# Patient Record
Sex: FEMALE | Race: OTHER | HISPANIC OR LATINO | Employment: FULL TIME | URBAN - METROPOLITAN AREA
[De-identification: names, ages, dates, MRNs, and addresses within clinical notes are randomized per-mention and may not be internally consistent; named-entity substitution may affect disease eponyms.]

---

## 2017-07-31 ENCOUNTER — HOSPITAL ENCOUNTER (EMERGENCY)
Facility: HOSPITAL | Age: 52
Discharge: HOME | End: 2017-07-31
Attending: EMERGENCY MEDICINE
Payer: COMMERCIAL

## 2017-07-31 ENCOUNTER — APPOINTMENT (EMERGENCY)
Dept: RADIOLOGY | Facility: HOSPITAL | Age: 52
End: 2017-07-31
Payer: COMMERCIAL

## 2017-07-31 VITALS
BODY MASS INDEX: 34.55 KG/M2 | OXYGEN SATURATION: 98 % | HEART RATE: 100 BPM | WEIGHT: 176 LBS | DIASTOLIC BLOOD PRESSURE: 84 MMHG | SYSTOLIC BLOOD PRESSURE: 125 MMHG | RESPIRATION RATE: 18 BRPM | HEIGHT: 60 IN | TEMPERATURE: 98.1 F

## 2017-07-31 DIAGNOSIS — G56.00 CARPAL TUNNEL SYNDROME: Primary | ICD-10-CM

## 2017-07-31 PROCEDURE — 96372 THER/PROPH/DIAG INJ SC/IM: CPT

## 2017-07-31 PROCEDURE — 99283 EMERGENCY DEPT VISIT LOW MDM: CPT

## 2017-07-31 PROCEDURE — 73110 X-RAY EXAM OF WRIST: CPT

## 2017-07-31 RX ORDER — KETOROLAC TROMETHAMINE 30 MG/ML
15 INJECTION, SOLUTION INTRAMUSCULAR; INTRAVENOUS ONCE
Status: COMPLETED | OUTPATIENT
Start: 2017-07-31 | End: 2017-07-31

## 2017-07-31 RX ORDER — NAPROXEN 500 MG/1
500 TABLET ORAL 2 TIMES DAILY WITH MEALS
Qty: 15 TABLET | Refills: 0 | Status: SHIPPED | OUTPATIENT
Start: 2017-07-31 | End: 2017-08-07

## 2017-07-31 RX ORDER — LANOLIN ALCOHOL/MO/W.PET/CERES
1 CREAM (GRAM) TOPICAL 3 TIMES DAILY
COMMUNITY

## 2017-07-31 RX ADMIN — KETOROLAC TROMETHAMINE 15 MG: 30 INJECTION, SOLUTION INTRAMUSCULAR at 22:22

## 2018-05-08 ENCOUNTER — FORM ENCOUNTER (OUTPATIENT)
Age: 53
End: 2018-05-08

## 2018-05-08 ENCOUNTER — APPOINTMENT (OUTPATIENT)
Dept: NEPHROLOGY | Facility: CLINIC | Age: 53
End: 2018-05-08
Payer: COMMERCIAL

## 2018-05-08 VITALS — SYSTOLIC BLOOD PRESSURE: 120 MMHG | DIASTOLIC BLOOD PRESSURE: 80 MMHG | HEART RATE: 75 BPM | RESPIRATION RATE: 12 BRPM

## 2018-05-08 DIAGNOSIS — D17.9 BENIGN LIPOMATOUS NEOPLASM, UNSPECIFIED: ICD-10-CM

## 2018-05-08 DIAGNOSIS — R82.99 OTHER ABNORMAL FINDINGS IN URINE: ICD-10-CM

## 2018-05-08 DIAGNOSIS — R31.29 OTHER MICROSCOPIC HEMATURIA: ICD-10-CM

## 2018-05-08 DIAGNOSIS — N28.1 CYST OF KIDNEY, ACQUIRED: ICD-10-CM

## 2018-05-08 PROCEDURE — 99244 OFF/OP CNSLTJ NEW/EST MOD 40: CPT

## 2018-05-09 ENCOUNTER — APPOINTMENT (OUTPATIENT)
Dept: ULTRASOUND IMAGING | Facility: HOSPITAL | Age: 53
End: 2018-05-09

## 2018-05-09 ENCOUNTER — OUTPATIENT (OUTPATIENT)
Dept: OUTPATIENT SERVICES | Facility: HOSPITAL | Age: 53
LOS: 1 days | End: 2018-05-09
Payer: COMMERCIAL

## 2018-05-09 PROCEDURE — 76770 US EXAM ABDO BACK WALL COMP: CPT | Mod: 26

## 2018-05-09 PROCEDURE — 76770 US EXAM ABDO BACK WALL COMP: CPT

## 2018-05-10 PROBLEM — R31.29 MICROSCOPIC HEMATURIA: Status: ACTIVE | Noted: 2018-05-08

## 2018-05-10 PROBLEM — R82.99 OTHER CELLS AND CASTS IN URINE: Status: ACTIVE | Noted: 2018-05-10

## 2018-05-10 PROBLEM — N28.1 RENAL CYST, LEFT: Status: ACTIVE | Noted: 2018-05-08

## 2018-05-10 PROBLEM — D17.9 ANGIOMYOLIPOMA: Status: ACTIVE | Noted: 2018-05-10

## 2018-05-10 LAB
APPEARANCE: CLEAR
BACTERIA: NEGATIVE
BILIRUBIN URINE: NEGATIVE
BLOOD URINE: ABNORMAL
COLOR: YELLOW
CORE LAB FLUID CYTOLOGY: NORMAL
GLUCOSE QUALITATIVE U: NEGATIVE MG/DL
HYALINE CASTS: 0 /LPF
KETONES URINE: NEGATIVE
LEUKOCYTE ESTERASE URINE: NEGATIVE
MICROSCOPIC-UA: NORMAL
NITRITE URINE: NEGATIVE
PH URINE: 5
PROTEIN URINE: NEGATIVE MG/DL
RED BLOOD CELLS URINE: 1 /HPF
SPECIFIC GRAVITY URINE: 1.02
SQUAMOUS EPITHELIAL CELLS: 1 /HPF
UROBILINOGEN URINE: NEGATIVE MG/DL
WHITE BLOOD CELLS URINE: 1 /HPF

## 2019-10-21 ENCOUNTER — LAB REQUISITION (OUTPATIENT)
Dept: LAB | Facility: HOSPITAL | Age: 54
End: 2019-10-21
Payer: COMMERCIAL

## 2019-10-21 DIAGNOSIS — D46.21 REFRACTORY ANEMIA WITH EXCESS OF BLASTS 1 (HCC): ICD-10-CM

## 2019-10-21 LAB
INR PPP: 1.76 (ref 0.84–1.19)
PROTHROMBIN TIME: 20.7 SECONDS (ref 11.6–14.5)

## 2019-10-21 PROCEDURE — 85610 PROTHROMBIN TIME: CPT | Performed by: CLINIC/CENTER

## 2019-10-31 ENCOUNTER — LAB REQUISITION (OUTPATIENT)
Dept: LAB | Facility: HOSPITAL | Age: 54
End: 2019-10-31
Payer: COMMERCIAL

## 2019-10-31 DIAGNOSIS — Z79.01 LONG TERM (CURRENT) USE OF ANTICOAGULANTS: ICD-10-CM

## 2019-10-31 LAB
INR PPP: 1.58 (ref 0.84–1.19)
PROTHROMBIN TIME: 19 SECONDS (ref 11.6–14.5)

## 2019-10-31 PROCEDURE — 85610 PROTHROMBIN TIME: CPT | Performed by: ORTHOPAEDIC SURGERY

## 2019-11-14 ENCOUNTER — TRANSCRIBE ORDERS (OUTPATIENT)
Dept: ADMINISTRATIVE | Facility: HOSPITAL | Age: 54
End: 2019-11-14

## 2019-11-14 ENCOUNTER — APPOINTMENT (OUTPATIENT)
Dept: LAB | Facility: HOSPITAL | Age: 54
End: 2019-11-14
Payer: COMMERCIAL

## 2019-11-14 DIAGNOSIS — Z79.01 LONG TERM (CURRENT) USE OF ANTICOAGULANTS: ICD-10-CM

## 2019-11-14 DIAGNOSIS — K21.9 CHALASIA OF LOWER ESOPHAGEAL SPHINCTER: ICD-10-CM

## 2019-11-14 DIAGNOSIS — K21.9 CHALASIA OF LOWER ESOPHAGEAL SPHINCTER: Primary | ICD-10-CM

## 2019-11-14 LAB
INR PPP: 2.03 (ref 0.84–1.19)
PROTHROMBIN TIME: 23.2 SECONDS (ref 11.6–14.5)

## 2019-11-14 PROCEDURE — 85610 PROTHROMBIN TIME: CPT

## 2019-11-14 PROCEDURE — 36415 COLL VENOUS BLD VENIPUNCTURE: CPT

## 2019-11-15 ENCOUNTER — TRANSCRIBE ORDERS (OUTPATIENT)
Dept: LAB | Facility: CLINIC | Age: 54
End: 2019-11-15

## 2019-11-21 ENCOUNTER — LAB REQUISITION (OUTPATIENT)
Dept: LAB | Facility: HOSPITAL | Age: 54
End: 2019-11-21
Payer: COMMERCIAL

## 2019-11-21 DIAGNOSIS — I11.9 HYPERTENSIVE HEART DISEASE WITHOUT HEART FAILURE: ICD-10-CM

## 2019-11-21 LAB
INR PPP: 1.92 (ref 0.84–1.19)
PROTHROMBIN TIME: 22.1 SECONDS (ref 11.6–14.5)

## 2019-11-21 PROCEDURE — 85610 PROTHROMBIN TIME: CPT | Performed by: ORTHOPAEDIC SURGERY

## 2021-04-08 ENCOUNTER — NURSE TRIAGE (OUTPATIENT)
Dept: OTHER | Facility: OTHER | Age: 56
End: 2021-04-08

## 2021-04-08 DIAGNOSIS — Z20.822 EXPOSURE TO COVID-19 VIRUS: Primary | ICD-10-CM

## 2021-04-08 DIAGNOSIS — Z20.822 EXPOSURE TO COVID-19 VIRUS: ICD-10-CM

## 2021-04-08 PROCEDURE — U0005 INFEC AGEN DETEC AMPLI PROBE: HCPCS | Performed by: FAMILY MEDICINE

## 2021-04-08 PROCEDURE — U0003 INFECTIOUS AGENT DETECTION BY NUCLEIC ACID (DNA OR RNA); SEVERE ACUTE RESPIRATORY SYNDROME CORONAVIRUS 2 (SARS-COV-2) (CORONAVIRUS DISEASE [COVID-19]), AMPLIFIED PROBE TECHNIQUE, MAKING USE OF HIGH THROUGHPUT TECHNOLOGIES AS DESCRIBED BY CMS-2020-01-R: HCPCS | Performed by: FAMILY MEDICINE

## 2021-04-08 NOTE — TELEPHONE ENCOUNTER
Regarding: COVID-19-Exposed 1 of 2  ----- Message from Kathy Irby sent at 4/8/2021 10:25 AM EDT -----  "My daughter tested COVID-19 positive and I would like to be tested "

## 2021-04-09 LAB — SARS-COV-2 RNA RESP QL NAA+PROBE: NEGATIVE

## 2021-04-13 DIAGNOSIS — Z23 ENCOUNTER FOR IMMUNIZATION: ICD-10-CM

## 2022-11-07 DIAGNOSIS — Z12.11 COLON CANCER SCREENING: ICD-10-CM

## 2022-11-08 LAB — SARS-COV-2 RNA RESP QL NAA+PROBE: NEGATIVE

## 2022-12-14 ENCOUNTER — HOSPITAL ENCOUNTER (EMERGENCY)
Facility: HOSPITAL | Age: 57
Discharge: HOME | End: 2022-12-14
Attending: EMERGENCY MEDICINE

## 2022-12-14 VITALS
HEIGHT: 60 IN | TEMPERATURE: 101.2 F | HEART RATE: 111 BPM | WEIGHT: 150 LBS | RESPIRATION RATE: 22 BRPM | SYSTOLIC BLOOD PRESSURE: 145 MMHG | DIASTOLIC BLOOD PRESSURE: 69 MMHG | BODY MASS INDEX: 29.45 KG/M2 | OXYGEN SATURATION: 96 %

## 2022-12-14 DIAGNOSIS — R68.89 FLU-LIKE SYMPTOMS: ICD-10-CM

## 2022-12-14 DIAGNOSIS — R50.9 FEVER: ICD-10-CM

## 2022-12-14 DIAGNOSIS — U07.1 COVID-19: Primary | ICD-10-CM

## 2022-12-14 LAB
FLUAV RNA RESP QL NAA+PROBE: NEGATIVE
FLUBV RNA RESP QL NAA+PROBE: NEGATIVE
RSV RNA RESP QL NAA+PROBE: NEGATIVE
SARS-COV-2 RNA RESP QL NAA+PROBE: POSITIVE

## 2022-12-14 RX ORDER — ACETAMINOPHEN 325 MG/1
650 TABLET ORAL ONCE
Status: COMPLETED | OUTPATIENT
Start: 2022-12-14 | End: 2022-12-14

## 2022-12-14 RX ORDER — METHYLPREDNISOLONE SODIUM SUCCINATE 125 MG/2ML
125 INJECTION, POWDER, LYOPHILIZED, FOR SOLUTION INTRAMUSCULAR; INTRAVENOUS ONCE
Status: COMPLETED | OUTPATIENT
Start: 2022-12-14 | End: 2022-12-14

## 2022-12-14 RX ORDER — NIRMATRELVIR AND RITONAVIR 300-100 MG
3 KIT ORAL 2 TIMES DAILY
Qty: 30 TABLET | Refills: 0 | Status: SHIPPED | OUTPATIENT
Start: 2022-12-14 | End: 2022-12-19

## 2022-12-14 RX ADMIN — ACETAMINOPHEN 650 MG: 325 TABLET, FILM COATED ORAL at 21:06

## 2022-12-14 RX ADMIN — METHYLPREDNISOLONE SODIUM SUCCINATE 125 MG: 125 INJECTION, POWDER, FOR SOLUTION INTRAMUSCULAR; INTRAVENOUS at 21:26

## 2022-12-15 NOTE — ED PROVIDER NOTES
History  Chief Complaint   Patient presents with   • Flu Symptoms     Pt c/o cough and congestion x5 days  Seen at urgent care and prescribed cough medicine with no relief     In contrast to stated note in triage, pt notes duration of sxs of 2 days  Cough, congestion, difficulty breathing through her nose, generally feeling unwell  Moderate severity  Symptoms constant  Unalleviated by meds prescribed at  earlier this week  Prior to Admission Medications   Prescriptions Last Dose Informant Patient Reported? Taking?   glucosamine-chondroitin 500-400 MG tablet   Yes No   Sig: Take 1 tablet by mouth 3 (three) times a day   naproxen (NAPROSYN) 500 mg tablet   No No   Sig: Take 1 tablet by mouth 2 (two) times a day with meals for 7 days      Facility-Administered Medications: None       No past medical history on file  Past Surgical History:   Procedure Laterality Date   • BREAST IMPLANT     •  SECTION     • TONSILLECTOMY         No family history on file  I have reviewed and agree with the history as documented  E-Cigarette/Vaping     E-Cigarette/Vaping Substances     Social History     Tobacco Use   • Smoking status: Never   Substance Use Topics   • Alcohol use: No   • Drug use: No       Review of Systems   Constitutional: Positive for fever  HENT: Positive for congestion  Respiratory: Positive for cough  All other systems reviewed and are negative  Physical Exam  Physical Exam  Vitals and nursing note reviewed  Constitutional:       General: She is not in acute distress  Appearance: Normal appearance  She is well-developed  She is not ill-appearing, toxic-appearing or diaphoretic  HENT:      Head: Normocephalic and atraumatic  Nose: Congestion present  Mouth/Throat:      Mouth: Mucous membranes are moist       Pharynx: Oropharynx is clear  Eyes:      General:         Right eye: No discharge  Left eye: No discharge        Conjunctiva/sclera: Conjunctivae normal       Pupils: Pupils are equal, round, and reactive to light  Neck:      Vascular: No JVD  Cardiovascular:      Pulses: Normal pulses  Pulmonary:      Effort: Pulmonary effort is normal  No respiratory distress  Breath sounds: No stridor  Musculoskeletal:         General: No tenderness or deformity  Normal range of motion  Cervical back: Normal range of motion and neck supple  No rigidity  Skin:     General: Skin is warm and dry  Capillary Refill: Capillary refill takes less than 2 seconds  Neurological:      General: No focal deficit present  Mental Status: She is alert and oriented to person, place, and time  Cranial Nerves: No cranial nerve deficit  Sensory: No sensory deficit  Motor: No weakness or abnormal muscle tone        Coordination: Coordination normal       Gait: Gait normal          Vital Signs  ED Triage Vitals [12/14/22 2002]   Temperature Pulse Respirations Blood Pressure SpO2   (!) 101 2 °F (38 4 °C) (!) 111 22 145/69 96 %      Temp Source Heart Rate Source Patient Position - Orthostatic VS BP Location FiO2 (%)   Tympanic Monitor Sitting Right arm --      Pain Score       --           Vitals:    12/14/22 2002   BP: 145/69   Pulse: (!) 111   Patient Position - Orthostatic VS: Sitting         Visual Acuity      ED Medications  Medications   acetaminophen (TYLENOL) tablet 650 mg (650 mg Oral Given 12/14/22 2106)   methylPREDNISolone sodium succinate (Solu-MEDROL) injection 125 mg (125 mg Intramuscular Given 12/14/22 2126)       Diagnostic Studies  Results Reviewed     Procedure Component Value Units Date/Time    FLU/RSV/COVID - if FLU/RSV clinically relevant [[id number]]  (Abnormal) Collected: 12/14/22 2005    Lab Status: Final result Specimen: Nares from Nose Updated: 12/14/22 2059     SARS-CoV-2 Positive     INFLUENZA A PCR Negative     INFLUENZA B PCR Negative     RSV PCR Negative    Narrative:      FOR PEDIATRIC PATIENTS - copy/paste COVID Guidelines URL to browser: [email or web address]    SARS-CoV-2 assay is a Nucleic Acid Amplification assay intended for the  qualitative detection of nucleic acid from SARS-CoV-2 in nasopharyngeal  swabs  Results are for the presumptive identification of SARS-CoV-2 RNA  Positive results are indicative of infection with SARS-CoV-2, the virus  causing COVID-19, but do not rule out bacterial infection or co-infection  with other viruses  Laboratories within the [place] and its  territories are required to report all positive results to the appropriate  public health authorities  Negative results do not preclude SARS-CoV-2  infection and should not be used as the sole basis for treatment or other  patient management decisions  Negative results must be combined with  clinical observations, patient history, and epidemiological information  This test has not been FDA cleared or approved  This test has been authorized by FDA under an Emergency Use Authorization  (EUA)  This test is only authorized for the duration of time the  declaration that circumstances exist justifying the authorization of the  emergency use of an in vitro diagnostic tests for detection of SARS-CoV-2  virus and/or diagnosis of COVID-19 infection under section 564(b)(1) of  the Act, 21 U  S C  634QCT-3(F)(0), unless the authorization is terminated  or revoked sooner  The test has been validated but independent review by FDA  and CLIA is pending  Test performed using [employer] GeneXpert: This RT-PCR assay targets N2,  a region unique to SARS-CoV-2  A conserved region in the E-gene was chosen  for pan-Sarbecovirus detection which includes SARS-CoV-2  According to CMS-2020-01-R, this platform meets the definition of high-throughput technology                   No orders to display              Procedures  Procedures         ED Course MDM    Disposition  Final diagnoses:   [id number]   Flu-like symptoms   Fever     Time reflects when diagnosis was documented in both MDM as applicable and the Disposition within this note     Time User Action Codes Description Comment    12/14/2022  9:13 PM [place] Add [U07 1] COVID-19     12/14/2022  9:13 PM [place] Add [R68 89] Flu-like symptoms     12/14/2022  9:13 PM [place] Add [R50 9] Fever       ED Disposition     ED Disposition   Discharge    Condition   Stable    Date/Time   Wed Dec 14, 2022  9:13 PM    Comment   Ck Forte discharge to home/self care  Follow-up Information     Follow up With Specialties Details Why Contact Info Additional Information    [facility] Emergency Department Emergency Medicine  If symptoms worsen [address] Emergency Department, [address] [place] [address], Critical access hospital          Discharge Medication List as of 12/14/2022  9:20 PM      START taking these medications    Details   nirmatrelvir & ritonavir (Paxlovid, 300/100,) tablet therapy pack Take 3 tablets by mouth 2 (two) times a day for 5 days Take 2 nirmatrelvir tablets + 1 ritonavir tablet together per dose, Starting Wed 12/14/2022, Until Mon 12/19/2022, Print         CONTINUE these medications which have NOT CHANGED    Details   glucosamine-chondroitin 500-400 MG tablet Take 1 tablet by mouth 3 (three) times a day, Historical Med      naproxen (NAPROSYN) 500 mg tablet Take 1 tablet by mouth 2 (two) times a day with meals for 7 days, Starting Mon 7/31/2017, Until Mon 8/7/2017, Print             No discharge procedures on file      PDMP Review     None          ED Provider  Electronically Signed by           Louise Joya MD  12/14/22 [id number]

## 2023-08-09 ENCOUNTER — HOSPITAL ENCOUNTER (EMERGENCY)
Facility: HOSPITAL | Age: 58
Discharge: HOME | End: 2023-08-09
Attending: EMERGENCY MEDICINE
Payer: COMMERCIAL

## 2023-08-09 VITALS
HEART RATE: 80 BPM | OXYGEN SATURATION: 100 % | DIASTOLIC BLOOD PRESSURE: 72 MMHG | RESPIRATION RATE: 16 BRPM | SYSTOLIC BLOOD PRESSURE: 113 MMHG | TEMPERATURE: 98.4 F

## 2023-08-09 DIAGNOSIS — R51.9 HEADACHE: Primary | ICD-10-CM

## 2023-08-09 LAB
EXT PREGNANCY TEST URINE: NEGATIVE
EXT. CONTROL: NORMAL

## 2023-08-09 PROCEDURE — 99284 EMERGENCY DEPT VISIT MOD MDM: CPT

## 2023-08-09 PROCEDURE — 81025 URINE PREGNANCY TEST: CPT

## 2023-08-09 RX ORDER — DIPHENHYDRAMINE HYDROCHLORIDE 50 MG/ML
25 INJECTION INTRAMUSCULAR; INTRAVENOUS ONCE
Status: COMPLETED | OUTPATIENT
Start: 2023-08-09 | End: 2023-08-09

## 2023-08-09 RX ORDER — METOCLOPRAMIDE HYDROCHLORIDE 5 MG/ML
10 INJECTION INTRAMUSCULAR; INTRAVENOUS ONCE
Status: COMPLETED | OUTPATIENT
Start: 2023-08-09 | End: 2023-08-09

## 2023-08-09 RX ORDER — KETOROLAC TROMETHAMINE 30 MG/ML
30 INJECTION, SOLUTION INTRAMUSCULAR; INTRAVENOUS ONCE
Status: COMPLETED | OUTPATIENT
Start: 2023-08-09 | End: 2023-08-09

## 2023-08-09 RX ORDER — OXYMETAZOLINE HYDROCHLORIDE 0.05 G/100ML
2 SPRAY NASAL ONCE
Status: COMPLETED | OUTPATIENT
Start: 2023-08-09 | End: 2023-08-09

## 2023-08-09 RX ADMIN — DIPHENHYDRAMINE HYDROCHLORIDE 25 MG: 50 INJECTION, SOLUTION INTRAMUSCULAR; INTRAVENOUS at 12:02

## 2023-08-09 RX ADMIN — SODIUM CHLORIDE 1000 ML: 0.9 INJECTION, SOLUTION INTRAVENOUS at 12:02

## 2023-08-09 RX ADMIN — METOCLOPRAMIDE 10 MG: 5 INJECTION, SOLUTION INTRAMUSCULAR; INTRAVENOUS at 12:02

## 2023-08-09 RX ADMIN — KETOROLAC TROMETHAMINE 30 MG: 30 INJECTION, SOLUTION INTRAMUSCULAR; INTRAVENOUS at 12:02

## 2023-08-09 RX ADMIN — OXYMETAZOLINE HYDROCHLORIDE 2 SPRAY: 0.05 SPRAY NASAL at 12:05

## 2023-08-09 NOTE — Clinical Note
Shikha Brannon was seen and treated in our emergency department on 8/9/2023. Diagnosis:     Shilpi Petit  may return to work on return date. She may return on this date: 08/11/2023         If you have any questions or concerns, please don't hesitate to call.       Cookie Frnaco    ______________________________           _______________          _______________  Hospital Representative                              Date                                Time

## 2023-08-09 NOTE — DISCHARGE INSTRUCTIONS
Continue taking home migraine medications  Follow up with neurologist  Pam until Friday    Return to ER if symptoms worsen

## 2023-08-09 NOTE — ED PROVIDER NOTES
History  Chief Complaint   Patient presents with   • Headache     Pt reports that she has a hx of migraines and started with one yesterday and has done everything and has no relief. Pt denies any trauma or fall     29-year-old female presents to the ER for evaluation of headache. Patient stated that she has been having a headache for the past 4 days. Patient stated that she has history of migraines and feels the same. Patient stated she felt that she had a migraine last week and did her home regimen and took her home Topamax and had relief. Patient stated that 4 days ago she felt her migraine coming back and redid her home regimen however this time did not relieve the pain. Pain is described as constant and severe. Pain located in right temporal lobe that extends to her right ear and right side of her neck. Patient states that she does feel she has right ear fullness. She also adds that she feels her right neck is stiff due to sitting at a desk all day. No trauma or injury. Patient denies blurred vision, focal weakness, dizziness, chest pain, shortness of breath. History provided by:  Patient      Prior to Admission Medications   Prescriptions Last Dose Informant Patient Reported? Taking?   glucosamine-chondroitin 500-400 MG tablet   Yes No   Sig: Take 1 tablet by mouth 3 (three) times a day   naproxen (NAPROSYN) 500 mg tablet   No No   Sig: Take 1 tablet by mouth 2 (two) times a day with meals for 7 days      Facility-Administered Medications: None       History reviewed. No pertinent past medical history. Past Surgical History:   Procedure Laterality Date   • BREAST IMPLANT     •  SECTION     • TONSILLECTOMY         History reviewed. No pertinent family history. I have reviewed and agree with the history as documented.     E-Cigarette/Vaping   • E-Cigarette Use Never User      E-Cigarette/Vaping Substances     Social History     Tobacco Use   • Smoking status: Never   Vaping Use   • Vaping Use: Never used   Substance Use Topics   • Alcohol use: No   • Drug use: No       Review of Systems   Constitutional: Negative for chills and fever. HENT: Positive for ear pain (right ear fullness). Negative for sore throat. Eyes: Negative for pain and visual disturbance. Respiratory: Negative for cough and shortness of breath. Cardiovascular: Negative for chest pain and palpitations. Gastrointestinal: Negative for abdominal pain and vomiting. Genitourinary: Negative for dysuria and hematuria. Musculoskeletal: Negative for arthralgias and back pain. Skin: Negative for color change and rash. Neurological: Positive for headaches. Negative for seizures and syncope. All other systems reviewed and are negative. Physical Exam  Physical Exam  Vitals and nursing note reviewed. Constitutional:       General: She is not in acute distress. Appearance: She is well-developed. HENT:      Head: Normocephalic and atraumatic. Right Ear: A middle ear effusion is present. Left Ear: External ear normal.   Eyes:      Conjunctiva/sclera: Conjunctivae normal.   Cardiovascular:      Rate and Rhythm: Normal rate and regular rhythm. Pulses: Normal pulses. Heart sounds: Normal heart sounds. No murmur heard. Pulmonary:      Effort: Pulmonary effort is normal. No respiratory distress. Breath sounds: Normal breath sounds. Abdominal:      Palpations: Abdomen is soft. Tenderness: There is no abdominal tenderness. Musculoskeletal:         General: No signs of injury. Cervical back: Neck supple. Skin:     General: Skin is warm and dry. Capillary Refill: Capillary refill takes less than 2 seconds. Neurological:      Mental Status: She is alert and oriented to person, place, and time. Mental status is at baseline. Cranial Nerves: No cranial nerve deficit. Sensory: No sensory deficit. Motor: No weakness.       Coordination: Coordination normal. Gait: Gait normal.      Deep Tendon Reflexes: Reflexes normal.   Psychiatric:         Mood and Affect: Mood normal.         Behavior: Behavior normal.         Vital Signs  ED Triage Vitals   Temperature Pulse Respirations Blood Pressure SpO2   08/09/23 1109 08/09/23 1109 08/09/23 1109 08/09/23 1109 08/09/23 1109   98.4 °F (36.9 °C) 102 17 118/80 100 %      Temp Source Heart Rate Source Patient Position - Orthostatic VS BP Location FiO2 (%)   08/09/23 1109 08/09/23 1109 08/09/23 1109 08/09/23 1109 --   Tympanic Monitor Sitting Left arm       Pain Score       08/09/23 1202       9           Vitals:    08/09/23 1109 08/09/23 1309   BP: 118/80 113/72   Pulse: 102 80   Patient Position - Orthostatic VS: Sitting          Visual Acuity      ED Medications  Medications   sodium chloride 0.9 % bolus 1,000 mL (0 mL Intravenous Stopped 8/9/23 1309)   ketorolac (TORADOL) injection 30 mg (30 mg Intravenous Given 8/9/23 1202)   metoclopramide (REGLAN) injection 10 mg (10 mg Intravenous Given 8/9/23 1202)   diphenhydrAMINE (BENADRYL) injection 25 mg (25 mg Intravenous Given 8/9/23 1202)   oxymetazoline (AFRIN) 0.05 % nasal spray 2 spray (2 sprays Each Nare Given 8/9/23 1205)       Diagnostic Studies  Results Reviewed     Procedure Component Value Units Date/Time    POCT pregnancy, urine [[id number]]  (Normal) Resulted: 08/09/23 1200    Lab Status: Edited Result - FINAL Updated: 08/09/23 1209     EXT Preg Test, Ur Negative     Control Valid                 No orders to display              Procedures  Procedures         ED Course                               SBIRT 22yo+    Flowsheet Row Most Recent Value   Initial Alcohol Screen: US AUDIT-C     1. How often do you have a drink containing alcohol? 0 Filed at: 08/09/2023 1112   2. How many drinks containing alcohol do you have on a typical day you are drinking? 0 Filed at: 08/09/2023 1112   3b. FEMALE Any Age, or MALE 65+:  How often do you have 4 or more drinks on one occassion? 0 Filed at: 08/09/2023 1112   Audit-C Score 0 Filed at: 08/09/2023 1112   RAMIN: How many times in the past year have you. .. Used an illegal drug or used a prescription medication for non-medical reasons? Never Filed at: 08/09/2023 1112                    Medical Decision Making  This patient presents with headache most consistent with benign headache from either tension type headache versus migraine. No headache red flags. Neurologic exam without evidence of altered mental status, focal neurologic findings so doubt meningitis, encephalitis, stroke. Presentation not consistent with acute intracranial bleed to include SAH. No history of trauma so doubt ICH. Given history and physical temporal arteritis unlikely, as this is acute angle-closure glaucoma. Pain was controlled with headache cocktail and patient discharged home with primary care follow-up. Neurologist given for referral.  Return to the ER if symptoms worsen or questions or concerns arise at home. Amount and/or Complexity of Data Reviewed  Labs: ordered. Risk  OTC drugs. Prescription drug management. Disposition  Final diagnoses:   Headache     Time reflects when diagnosis was documented in both MDM as applicable and the Disposition within this note     Time User Action Codes Description Comment    8/9/2023 12:49 PM Mariela Cerrato Add [R51.9] Headache       ED Disposition     ED Disposition   Discharge    Condition   Stable    Date/Time   Wed Aug 9, 2023 12:49 PM    Comment   Gloria Perera discharge to home/self care.                Follow-up Information     Follow up With Specialties Details Why Contact Info Additional [address], [place]    [address]  [address]  [address] [facility]   [address] [place] [id number]  [address] [place] [address] [facility], [facility] [address], [address] [facility] [place]          Discharge Medication List as of 8/9/2023 12:56 PM      CONTINUE these medications which have NOT CHANGED    Details   glucosamine-chondroitin 500-400 MG tablet Take 1 tablet by mouth 3 (three) times a day, Historical Med      naproxen (NAPROSYN) 500 mg tablet Take 1 tablet by mouth 2 (two) times a day with meals for 7 days, Starting Mon 7/31/2017, Until Mon 8/7/2017, Print             No discharge procedures on file.     PDMP Review     None          ED Provider  Electronically Signed by           STEFANIE Chen  08/09/23 7162

## 2024-03-12 ENCOUNTER — APPOINTMENT (EMERGENCY)
Dept: RADIOLOGY | Facility: HOSPITAL | Age: 59
DRG: 199 | End: 2024-03-12
Payer: COMMERCIAL

## 2024-03-12 ENCOUNTER — APPOINTMENT (EMERGENCY)
Dept: CT IMAGING | Facility: HOSPITAL | Age: 59
DRG: 199 | End: 2024-03-12
Payer: COMMERCIAL

## 2024-03-12 ENCOUNTER — HOSPITAL ENCOUNTER (INPATIENT)
Facility: HOSPITAL | Age: 59
LOS: 2 days | Discharge: HOME | DRG: 199 | End: 2024-03-14
Attending: EMERGENCY MEDICINE | Admitting: STUDENT IN AN ORGANIZED HEALTH CARE EDUCATION/TRAINING PROGRAM
Payer: COMMERCIAL

## 2024-03-12 DIAGNOSIS — J06.9 VIRAL URI WITH COUGH: ICD-10-CM

## 2024-03-12 DIAGNOSIS — I26.99 PULMONARY EMBOLI (HCC): ICD-10-CM

## 2024-03-12 DIAGNOSIS — I26.99 PULMONARY EMBOLISM (HCC): ICD-10-CM

## 2024-03-12 DIAGNOSIS — R06.09 EXERTIONAL DYSPNEA: ICD-10-CM

## 2024-03-12 DIAGNOSIS — J93.83 SPONTANEOUS PNEUMOTHORAX: Primary | ICD-10-CM

## 2024-03-12 DIAGNOSIS — R07.9 CHEST PAIN: ICD-10-CM

## 2024-03-12 PROBLEM — R65.10 SIRS (SYSTEMIC INFLAMMATORY RESPONSE SYNDROME) (HCC): Status: ACTIVE | Noted: 2024-03-12

## 2024-03-12 PROBLEM — R06.89 INSUFFICIENCY, RESPIRATORY, ACUTE: Status: ACTIVE | Noted: 2024-03-12

## 2024-03-12 PROBLEM — J93.9 PNEUMOTHORAX, LEFT: Status: ACTIVE | Noted: 2024-03-12

## 2024-03-12 LAB
ALBUMIN SERPL BCP-MCNC: 4.6 G/DL (ref 3.5–5)
ALP SERPL-CCNC: 73 U/L (ref 34–104)
ALT SERPL W P-5'-P-CCNC: 28 U/L (ref 7–52)
ANION GAP SERPL CALCULATED.3IONS-SCNC: 9 MMOL/L (ref 4–13)
APTT PPP: 29 SECONDS (ref 23–37)
AST SERPL W P-5'-P-CCNC: 13 U/L (ref 13–39)
BASOPHILS # BLD AUTO: 0.04 THOUSANDS/ÂΜL (ref 0–0.1)
BASOPHILS NFR BLD AUTO: 1 % (ref 0–1)
BILIRUB DIRECT SERPL-MCNC: 0.08 MG/DL (ref 0–0.2)
BILIRUB SERPL-MCNC: 0.69 MG/DL (ref 0.2–1)
BNP SERPL-MCNC: 6 PG/ML (ref 0–100)
BUN SERPL-MCNC: 17 MG/DL (ref 5–25)
CALCIUM SERPL-MCNC: 9.6 MG/DL (ref 8.4–10.2)
CARDIAC TROPONIN I PNL SERPL HS: <2 NG/L
CARDIAC TROPONIN I PNL SERPL HS: <2 NG/L
CHLORIDE SERPL-SCNC: 106 MMOL/L (ref 96–108)
CO2 SERPL-SCNC: 24 MMOL/L (ref 21–32)
CREAT SERPL-MCNC: 0.98 MG/DL (ref 0.6–1.3)
D DIMER PPP FEU-MCNC: 2.29 UG/ML FEU
EOSINOPHIL # BLD AUTO: 0.15 THOUSAND/ÂΜL (ref 0–0.61)
EOSINOPHIL NFR BLD AUTO: 2 % (ref 0–6)
ERYTHROCYTE [DISTWIDTH] IN BLOOD BY AUTOMATED COUNT: 14.4 % (ref 11.6–15.1)
FLUAV RNA RESP QL NAA+PROBE: NEGATIVE
FLUBV RNA RESP QL NAA+PROBE: NEGATIVE
GFR SERPL CREATININE-BSD FRML MDRD: 64 ML/MIN/1.73SQ M
GLUCOSE SERPL-MCNC: 90 MG/DL (ref 65–140)
HCG SERPL QL: NEGATIVE
HCT VFR BLD AUTO: 47.9 % (ref 34.8–46.1)
HGB BLD-MCNC: 16.3 G/DL (ref 11.5–15.4)
IMM GRANULOCYTES # BLD AUTO: 0.02 THOUSAND/UL (ref 0–0.2)
IMM GRANULOCYTES NFR BLD AUTO: 0 % (ref 0–2)
INR PPP: 0.93 (ref 0.84–1.19)
LYMPHOCYTES # BLD AUTO: 3.04 THOUSANDS/ÂΜL (ref 0.6–4.47)
LYMPHOCYTES NFR BLD AUTO: 44 % (ref 14–44)
MAGNESIUM SERPL-MCNC: 2.2 MG/DL (ref 1.9–2.7)
MCH RBC QN AUTO: 31.2 PG (ref 26.8–34.3)
MCHC RBC AUTO-ENTMCNC: 34 G/DL (ref 31.4–37.4)
MCV RBC AUTO: 92 FL (ref 82–98)
MONOCYTES # BLD AUTO: 0.65 THOUSAND/ÂΜL (ref 0.17–1.22)
MONOCYTES NFR BLD AUTO: 10 % (ref 4–12)
NEUTROPHILS # BLD AUTO: 2.97 THOUSANDS/ÂΜL (ref 1.85–7.62)
NEUTS SEG NFR BLD AUTO: 43 % (ref 43–75)
NRBC BLD AUTO-RTO: 0 /100 WBCS
PLATELET # BLD AUTO: 326 THOUSANDS/UL (ref 149–390)
PMV BLD AUTO: 9.1 FL (ref 8.9–12.7)
POTASSIUM SERPL-SCNC: 3.7 MMOL/L (ref 3.5–5.3)
PROCALCITONIN SERPL-MCNC: <0.05 NG/ML
PROT SERPL-MCNC: 7.7 G/DL (ref 6.4–8.4)
PROTHROMBIN TIME: 13 SECONDS (ref 11.6–14.5)
RBC # BLD AUTO: 5.23 MILLION/UL (ref 3.81–5.12)
RSV RNA RESP QL NAA+PROBE: NEGATIVE
SARS-COV-2 RNA RESP QL NAA+PROBE: NEGATIVE
SODIUM SERPL-SCNC: 139 MMOL/L (ref 135–147)
WBC # BLD AUTO: 6.87 THOUSAND/UL (ref 4.31–10.16)

## 2024-03-12 PROCEDURE — 0WPBX0Z REMOVAL OF DRAINAGE DEVICE FROM LEFT PLEURAL CAVITY, EXTERNAL APPROACH: ICD-10-PCS | Performed by: INTERNAL MEDICINE

## 2024-03-12 PROCEDURE — 85730 THROMBOPLASTIN TIME PARTIAL: CPT | Performed by: EMERGENCY MEDICINE

## 2024-03-12 PROCEDURE — 94640 AIRWAY INHALATION TREATMENT: CPT

## 2024-03-12 PROCEDURE — 99152 MOD SED SAME PHYS/QHP 5/>YRS: CPT | Performed by: EMERGENCY MEDICINE

## 2024-03-12 PROCEDURE — 99285 EMERGENCY DEPT VISIT HI MDM: CPT | Performed by: EMERGENCY MEDICINE

## 2024-03-12 PROCEDURE — 80048 BASIC METABOLIC PNL TOTAL CA: CPT | Performed by: EMERGENCY MEDICINE

## 2024-03-12 PROCEDURE — 96374 THER/PROPH/DIAG INJ IV PUSH: CPT

## 2024-03-12 PROCEDURE — 36415 COLL VENOUS BLD VENIPUNCTURE: CPT | Performed by: EMERGENCY MEDICINE

## 2024-03-12 PROCEDURE — 71046 X-RAY EXAM CHEST 2 VIEWS: CPT

## 2024-03-12 PROCEDURE — 99285 EMERGENCY DEPT VISIT HI MDM: CPT

## 2024-03-12 PROCEDURE — 85025 COMPLETE CBC W/AUTO DIFF WBC: CPT | Performed by: EMERGENCY MEDICINE

## 2024-03-12 PROCEDURE — 0241U HB NFCT DS VIR RESP RNA 4 TRGT: CPT | Performed by: EMERGENCY MEDICINE

## 2024-03-12 PROCEDURE — 85610 PROTHROMBIN TIME: CPT | Performed by: EMERGENCY MEDICINE

## 2024-03-12 PROCEDURE — 93005 ELECTROCARDIOGRAM TRACING: CPT

## 2024-03-12 PROCEDURE — 71275 CT ANGIOGRAPHY CHEST: CPT

## 2024-03-12 PROCEDURE — 84703 CHORIONIC GONADOTROPIN ASSAY: CPT | Performed by: EMERGENCY MEDICINE

## 2024-03-12 PROCEDURE — 85379 FIBRIN DEGRADATION QUANT: CPT | Performed by: EMERGENCY MEDICINE

## 2024-03-12 PROCEDURE — 32551 INSERTION OF CHEST TUBE: CPT | Performed by: EMERGENCY MEDICINE

## 2024-03-12 PROCEDURE — 84484 ASSAY OF TROPONIN QUANT: CPT | Performed by: EMERGENCY MEDICINE

## 2024-03-12 PROCEDURE — 0W9B30Z DRAINAGE OF LEFT PLEURAL CAVITY WITH DRAINAGE DEVICE, PERCUTANEOUS APPROACH: ICD-10-PCS | Performed by: STUDENT IN AN ORGANIZED HEALTH CARE EDUCATION/TRAINING PROGRAM

## 2024-03-12 PROCEDURE — 83735 ASSAY OF MAGNESIUM: CPT | Performed by: EMERGENCY MEDICINE

## 2024-03-12 PROCEDURE — 96375 TX/PRO/DX INJ NEW DRUG ADDON: CPT

## 2024-03-12 PROCEDURE — 96361 HYDRATE IV INFUSION ADD-ON: CPT

## 2024-03-12 PROCEDURE — 71045 X-RAY EXAM CHEST 1 VIEW: CPT

## 2024-03-12 PROCEDURE — 83880 ASSAY OF NATRIURETIC PEPTIDE: CPT | Performed by: EMERGENCY MEDICINE

## 2024-03-12 PROCEDURE — 84145 PROCALCITONIN (PCT): CPT

## 2024-03-12 PROCEDURE — 80076 HEPATIC FUNCTION PANEL: CPT | Performed by: EMERGENCY MEDICINE

## 2024-03-12 RX ORDER — HEPARIN SODIUM 10000 [USP'U]/100ML
3-30 INJECTION, SOLUTION INTRAVENOUS
Status: DISCONTINUED | OUTPATIENT
Start: 2024-03-12 | End: 2024-03-14

## 2024-03-12 RX ORDER — LIDOCAINE HYDROCHLORIDE AND EPINEPHRINE 10; 10 MG/ML; UG/ML
20 INJECTION, SOLUTION INFILTRATION; PERINEURAL ONCE
Status: COMPLETED | OUTPATIENT
Start: 2024-03-12 | End: 2024-03-12

## 2024-03-12 RX ORDER — ASPIRIN 81 MG/1
324 TABLET, CHEWABLE ORAL ONCE
Status: COMPLETED | OUTPATIENT
Start: 2024-03-12 | End: 2024-03-12

## 2024-03-12 RX ORDER — SODIUM CHLORIDE, SODIUM GLUCONATE, SODIUM ACETATE, POTASSIUM CHLORIDE, MAGNESIUM CHLORIDE, SODIUM PHOSPHATE, DIBASIC, AND POTASSIUM PHOSPHATE .53; .5; .37; .037; .03; .012; .00082 G/100ML; G/100ML; G/100ML; G/100ML; G/100ML; G/100ML; G/100ML
50 INJECTION, SOLUTION INTRAVENOUS CONTINUOUS
Status: DISCONTINUED | OUTPATIENT
Start: 2024-03-12 | End: 2024-03-13

## 2024-03-12 RX ORDER — KETAMINE HCL IN NACL, ISO-OSM 100MG/10ML
150 SYRINGE (ML) INJECTION ONCE
Status: COMPLETED | OUTPATIENT
Start: 2024-03-12 | End: 2024-03-12

## 2024-03-12 RX ORDER — KETOROLAC TROMETHAMINE 30 MG/ML
15 INJECTION, SOLUTION INTRAMUSCULAR; INTRAVENOUS EVERY 6 HOURS PRN
Status: DISCONTINUED | OUTPATIENT
Start: 2024-03-12 | End: 2024-03-14

## 2024-03-12 RX ORDER — HEPARIN SODIUM 1000 [USP'U]/ML
5600 INJECTION, SOLUTION INTRAVENOUS; SUBCUTANEOUS ONCE
Status: COMPLETED | OUTPATIENT
Start: 2024-03-12 | End: 2024-03-12

## 2024-03-12 RX ORDER — ACETAMINOPHEN 10 MG/ML
1000 INJECTION, SOLUTION INTRAVENOUS ONCE
Status: COMPLETED | OUTPATIENT
Start: 2024-03-12 | End: 2024-03-13

## 2024-03-12 RX ORDER — LIDOCAINE 50 MG/G
1 PATCH TOPICAL DAILY
Status: DISCONTINUED | OUTPATIENT
Start: 2024-03-12 | End: 2024-03-14 | Stop reason: HOSPADM

## 2024-03-12 RX ORDER — FENTANYL CITRATE 50 UG/ML
50 INJECTION, SOLUTION INTRAMUSCULAR; INTRAVENOUS ONCE
Status: COMPLETED | OUTPATIENT
Start: 2024-03-12 | End: 2024-03-12

## 2024-03-12 RX ORDER — CHLORHEXIDINE GLUCONATE ORAL RINSE 1.2 MG/ML
15 SOLUTION DENTAL EVERY 12 HOURS SCHEDULED
Status: DISCONTINUED | OUTPATIENT
Start: 2024-03-12 | End: 2024-03-13

## 2024-03-12 RX ORDER — ONDANSETRON 2 MG/ML
4 INJECTION INTRAMUSCULAR; INTRAVENOUS ONCE
Status: COMPLETED | OUTPATIENT
Start: 2024-03-12 | End: 2024-03-12

## 2024-03-12 RX ADMIN — ASPIRIN 324 MG: 81 TABLET, CHEWABLE ORAL at 17:13

## 2024-03-12 RX ADMIN — IPRATROPIUM BROMIDE 0.5 MG: 0.5 SOLUTION RESPIRATORY (INHALATION) at 17:14

## 2024-03-12 RX ADMIN — SODIUM CHLORIDE 500 ML: 0.9 INJECTION, SOLUTION INTRAVENOUS at 17:24

## 2024-03-12 RX ADMIN — IOHEXOL 85 ML: 350 INJECTION, SOLUTION INTRAVENOUS at 19:08

## 2024-03-12 RX ADMIN — FENTANYL CITRATE 50 MCG: 50 INJECTION INTRAMUSCULAR; INTRAVENOUS at 20:48

## 2024-03-12 RX ADMIN — HEPARIN SODIUM 18 UNITS/KG/HR: 10000 INJECTION, SOLUTION INTRAVENOUS at 21:46

## 2024-03-12 RX ADMIN — ALBUTEROL SULFATE 5 MG: 2.5 SOLUTION RESPIRATORY (INHALATION) at 17:14

## 2024-03-12 RX ADMIN — Medication 70 MG: at 20:13

## 2024-03-12 RX ADMIN — LIDOCAINE HYDROCHLORIDE,EPINEPHRINE BITARTRATE 20 ML: 10; .01 INJECTION, SOLUTION INFILTRATION; PERINEURAL at 19:14

## 2024-03-12 RX ADMIN — HEPARIN SODIUM 5600 UNITS: 1000 INJECTION INTRAVENOUS; SUBCUTANEOUS at 21:41

## 2024-03-12 RX ADMIN — ONDANSETRON 4 MG: 2 INJECTION INTRAMUSCULAR; INTRAVENOUS at 19:34

## 2024-03-12 RX ADMIN — SODIUM CHLORIDE, SODIUM GLUCONATE, SODIUM ACETATE, POTASSIUM CHLORIDE, MAGNESIUM CHLORIDE, SODIUM PHOSPHATE, DIBASIC, AND POTASSIUM PHOSPHATE 50 ML/HR: .53; .5; .37; .037; .03; .012; .00082 INJECTION, SOLUTION INTRAVENOUS at 22:55

## 2024-03-12 NOTE — ED PROVIDER NOTES
History  Chief Complaint   Patient presents with    Shortness of Breath     Patient reports cough, congestion and phlegm for several weeks, seen at urgent care for same 1 week ago and provided prednisone but patient reports chest tightness and shortness of breath starting yesterday.      Patient is a 58-year-old female with past medical history significant for bilateral PE in  after breast implant surgery and tummy tuck, history of prior , currently on Ozempic for weight loss, presents to the emergency department complaining of cough and congestion for the past 2 weeks as well as new chest tightness and dyspnea since yesterday.  Patient states that for about 2 to 3 weeks she has had a productive cough mostly of clear phlegm that overall seems to be improving but still lingers.  She also has had runny nose and congestion.  She went to urgent care 1 week ago who started her on prednisone for 5 days which she completed.  She is also been taking Mucinex as well as Tessalon Perles which were prescribed at urgent care.  Patient states that since yesterday she has felt tightness in her chest as well as pain that radiates into her left upper arm.  She also has been having dyspnea with exertion but denies any dyspnea at rest.  She denies any palpitations, recent fevers or chills, hemoptysis, sore throat, neck pain or stiffness, abdominal pain, nausea, vomiting, change in bowel habits, blood per rectum or melena, urinary symptoms, skin rash or color change, leg pain or swelling, extremity weakness or paresthesia or other focal neurologic deficits.  Denies any recent prolonged travel or immobilization or recent surgery.  She was treated with Coumadin for 6 months in  for her bilateral PE.  She states at that time it was deemed to be secondary to surgery.  She did have genetic testing at that time which was negative.  Denies any personal cardiac history.  Denies smoking, history of asthma or COPD.      History  provided by:  Patient and spouse   used: No    Shortness of Breath  Associated symptoms: chest pain and cough    Associated symptoms: no abdominal pain, no diaphoresis, no ear pain, no fever, no headaches, no neck pain, no rash, no sore throat, no vomiting and no wheezing        Prior to Admission Medications   Prescriptions Last Dose Informant Patient Reported? Taking?   glucosamine-chondroitin 500-400 MG tablet   Yes No   Sig: Take 1 tablet by mouth 3 (three) times a day   naproxen (NAPROSYN) 500 mg tablet   No No   Sig: Take 1 tablet by mouth 2 (two) times a day with meals for 7 days      Facility-Administered Medications: None       History reviewed. No pertinent past medical history.    Past Surgical History:   Procedure Laterality Date    BREAST IMPLANT      BREAST IMPLANT       SECTION      KNEE SURGERY      TONSILLECTOMY         History reviewed. No pertinent family history.  I have reviewed and agree with the history as documented.    E-Cigarette/Vaping    E-Cigarette Use Never User      E-Cigarette/Vaping Substances     Social History     Tobacco Use    Smoking status: Never   Vaping Use    Vaping status: Never Used   Substance Use Topics    Alcohol use: No    Drug use: No       Review of Systems   Constitutional:  Negative for chills, diaphoresis and fever.   HENT:  Positive for congestion and rhinorrhea. Negative for ear pain and sore throat.    Respiratory:  Positive for cough, chest tightness and shortness of breath. Negative for wheezing.    Cardiovascular:  Positive for chest pain. Negative for palpitations.   Gastrointestinal:  Negative for abdominal distention, abdominal pain, blood in stool, constipation, diarrhea, nausea and vomiting.   Genitourinary:  Negative for dysuria, flank pain, frequency and hematuria.   Musculoskeletal:  Negative for back pain, neck pain and neck stiffness.   Skin:  Negative for color change, pallor, rash and wound.   Allergic/Immunologic:  Negative for immunocompromised state.   Neurological:  Negative for dizziness, syncope, weakness, light-headedness, numbness and headaches.   Hematological:  Negative for adenopathy. Does not bruise/bleed easily.   Psychiatric/Behavioral:  Negative for confusion and decreased concentration.    All other systems reviewed and are negative.      Physical Exam  Physical Exam  Vitals and nursing note reviewed.   Constitutional:       General: She is not in acute distress.     Appearance: Normal appearance. She is well-developed. She is not ill-appearing, toxic-appearing or diaphoretic.   HENT:      Head: Normocephalic and atraumatic.      Right Ear: External ear normal.      Left Ear: External ear normal.      Mouth/Throat:      Mouth: Mucous membranes are moist.      Pharynx: Oropharynx is clear.   Eyes:      Extraocular Movements: Extraocular movements intact.      Conjunctiva/sclera: Conjunctivae normal.   Neck:      Vascular: No JVD.   Cardiovascular:      Rate and Rhythm: Regular rhythm. Tachycardia present.      Pulses: Normal pulses.      Heart sounds: Normal heart sounds. No murmur heard.     No friction rub. No gallop.   Pulmonary:      Effort: Pulmonary effort is normal. No respiratory distress.      Breath sounds: No wheezing, rhonchi or rales.      Comments: Slight decreased air movement throughout.  Abdominal:      General: There is no distension.      Palpations: Abdomen is soft.      Tenderness: There is no abdominal tenderness. There is no guarding or rebound.   Musculoskeletal:         General: No swelling or tenderness. Normal range of motion.      Cervical back: Normal range of motion and neck supple. No rigidity.      Right lower leg: No edema.      Left lower leg: No edema.   Skin:     General: Skin is warm and dry.      Coloration: Skin is not pale.      Findings: No erythema or rash.   Neurological:      General: No focal deficit present.      Mental Status: She is alert and oriented to person,  place, and time.      Sensory: No sensory deficit.      Motor: No weakness.   Psychiatric:         Mood and Affect: Mood normal.         Behavior: Behavior normal.         Vital Signs  ED Triage Vitals [03/12/24 1636]   Temperature Pulse Respirations Blood Pressure SpO2   98 °F (36.7 °C) (!) 107 18 138/91 98 %      Temp src Heart Rate Source Patient Position - Orthostatic VS BP Location FiO2 (%)   -- -- -- -- --      Pain Score       5         Vitals:    03/12/24 2200 03/12/24 2215 03/12/24 2230 03/12/24 2245   BP: 130/76 132/71 138/85 129/79   Pulse: 104 105 (!) 109 (!) 107   Resp: (!) 25 22 20 (!) 23   Temp:       SpO2: 97% 97% 98% 98%   Weight:              Visual Acuity      ED Medications  Medications   heparin (porcine) 25,000 units in 0.45% NaCl 250 mL infusion (premix) ( Intravenous Not Given 3/12/24 2205)   chlorhexidine (PERIDEX) 0.12 % oral rinse 15 mL (has no administration in time range)   multi-electrolyte (PLASMALYTE-A/ISOLYTE-S PH 7.4) IV solution (has no administration in time range)   lidocaine (LIDODERM) 5 % patch 1 patch (has no administration in time range)   sodium chloride 0.9 % bolus 500 mL (0 mL Intravenous Stopped 3/12/24 1914)   aspirin chewable tablet 324 mg (324 mg Oral Given 3/12/24 1713)   ipratropium (ATROVENT) 0.02 % inhalation solution 0.5 mg (0.5 mg Nebulization Given 3/12/24 1714)   albuterol inhalation solution 5 mg (5 mg Nebulization Given 3/12/24 1714)   iohexol (OMNIPAQUE) 350 MG/ML injection (MULTI-DOSE) 85 mL (85 mL Intravenous Given 3/12/24 1908)   lidocaine-epinephrine (XYLOCAINE/EPINEPHRINE) 1 %-1:100,000 injection 20 mL (20 mL Infiltration Given by Other 3/12/24 1914)   Ketamine HCl 150 mg (70 mg Intravenous Given 3/12/24 2013)   ondansetron (ZOFRAN) injection 4 mg (4 mg Intravenous Given 3/12/24 1934)   fentaNYL injection 50 mcg (50 mcg Intravenous Given 3/12/24 2048)   heparin (porcine) injection 5,600 Units (5,600 Units Intravenous Given 3/12/24 2141)       Diagnostic  Studies  Results Reviewed       Procedure Component Value Units Date/Time    Procalcitonin [[id number]] Collected: 03/12/24 1716    Lab Status: In process Specimen: Blood from Arm, Left Updated: 03/12/24 2240    Lactic acid, plasma (w/reflex if result > 2.0) [[id number]]     Lab Status: No result Specimen: Blood     APTT [[id number]]     Lab Status: No result Specimen: Blood     HS Troponin I 2hr [[id number]] Collected: 03/12/24 1913    Lab Status: Final result Specimen: Blood from Arm, Left Updated: 03/12/24 1943     hs TnI 2hr <2 ng/L      Delta 2hr hsTnI --    FLU/RSV/COVID - if FLU/RSV clinically relevant [[id number]]  (Normal) Collected: 03/12/24 1716    Lab Status: Final result Specimen: Nares from Nose Updated: 03/12/24 1810     SARS-CoV-2 Negative     INFLUENZA A PCR Negative     INFLUENZA B PCR Negative     RSV PCR Negative    Narrative:      FOR PEDIATRIC PATIENTS - copy/paste COVID Guidelines URL to browser: [email or web address]    SARS-CoV-2 assay is a Nucleic Acid Amplification assay intended for the  qualitative detection of nucleic acid from SARS-CoV-2 in nasopharyngeal  swabs. Results are for the presumptive identification of SARS-CoV-2 RNA.    Positive results are indicative of infection with SARS-CoV-2, the virus  causing COVID-19, but do not rule out bacterial infection or co-infection  with other viruses. Laboratories within the [place] and its  territories are required to report all positive results to the appropriate  public health authorities. Negative results do not preclude SARS-CoV-2  infection and should not be used as the sole basis for treatment or other  patient management decisions. Negative results must be combined with  clinical observations, patient history, and epidemiological information.  This test has not been FDA cleared or approved.    This test has been authorized by FDA under an Emergency Use Authorization  (EUA). This  test is only authorized for the duration of time the  declaration that circumstances exist justifying the authorization of the  emergency use of an in vitro diagnostic tests for detection of SARS-CoV-2  virus and/or diagnosis of COVID-19 infection under section 564(b)(1) of  the Act, 21 U.S.C. 360bbb-3(b)(1), unless the authorization is terminated  or revoked sooner. The test has been validated but independent review by FDA  and CLIA is pending.    Test performed using [employer] GeneXpert: This RT-PCR assay targets N2,  a region unique to SARS-CoV-2. A conserved region in the E-gene was chosen  for pan-Sarbecovirus detection which includes SARS-CoV-2.    According to CMS-2020-01-R, this platform meets the definition of high-throughput technology.    hCG, qualitative pregnancy [[id number]]  (Normal) Collected: 03/12/24 1716    Lab Status: Final result Specimen: Blood from Arm, Left Updated: 03/12/24 1803     Preg, Serum Negative    B-Type Natriuretic Peptide(BNP) [[id number]]  (Normal) Collected: 03/12/24 1716    Lab Status: Final result Specimen: Blood from Arm, Left Updated: 03/12/24 1803     BNP 6 pg/mL     HS Troponin 0hr (reflex protocol) [[id number]]  (Normal) Collected: 03/12/24 1716    Lab Status: Final result Specimen: Blood from Arm, Left Updated: 03/12/24 1756     hs TnI 0hr <2 ng/L     Basic metabolic panel [[id number]] Collected: 03/12/24 1716    Lab Status: Final result Specimen: Blood from Arm, Left Updated: 03/12/24 1753     Sodium 139 mmol/L      Potassium 3.7 mmol/L      Chloride 106 mmol/L      CO2 24 mmol/L      ANION GAP 9 mmol/L      BUN 17 mg/dL      Creatinine 0.98 mg/dL      Glucose 90 mg/dL      Calcium 9.6 mg/dL      eGFR 64 ml/min/1.73sq m     Narrative:      National Kidney Disease Foundation guidelines for Chronic Kidney Disease (CKD):     Stage 1 with normal or high GFR (GFR > 90 mL/min/1.73 square meters)    Stage 2 Mild CKD (GFR = 60-89 mL/min/1.73 square meters)    Stage 3A Moderate CKD  (GFR = 45-59 mL/min/1.73 square meters)    Stage 3B Moderate CKD (GFR = 30-44 mL/min/1.73 square meters)    Stage 4 Severe CKD (GFR = 15-29 mL/min/1.73 square meters)    Stage 5 End Stage CKD (GFR <15 mL/min/1.73 square meters)  Note: GFR calculation is accurate only with a steady state creatinine    Hepatic function panel [[id number]]  (Normal) Collected: 03/12/24 1716    Lab Status: Final result Specimen: Blood from Arm, Left Updated: 03/12/24 1753     Total Bilirubin 0.69 mg/dL      Bilirubin, Direct 0.08 mg/dL      Alkaline Phosphatase 73 U/L      AST 13 U/L      ALT 28 U/L      Total Protein 7.7 g/dL      Albumin 4.6 g/dL     Magnesium [[id number]]  (Normal) Collected: 03/12/24 1716    Lab Status: Final result Specimen: Blood from Arm, Left Updated: 03/12/24 1753     Magnesium 2.2 mg/dL     D-Dimer [[id number]]  (Abnormal) Collected: 03/12/24 1716    Lab Status: Final result Specimen: Blood from Arm, Left Updated: 03/12/24 1749     D-Dimer, Quant 2.29 ug/ml FEU     Narrative:      In the evaluation for possible pulmonary embolism, in the appropriate (Well's Score of 4 or less) patient, the age adjusted d-dimer cutoff for this patient can be calculated as:    Age x 0.01 (in ug/mL) for Age-adjusted D-dimer exclusion threshold for a patient over 50 years.    Protime-INR [[id number]]  (Normal) Collected: 03/12/24 1716    Lab Status: Final result Specimen: Blood from Arm, Left Updated: 03/12/24 1746     Protime 13.0 seconds      INR 0.93    APTT [[id number]]  (Normal) Collected: 03/12/24 1716    Lab Status: Final result Specimen: Blood from Arm, Left Updated: 03/12/24 1746     PTT 29 seconds     CBC and differential [[id number]]  (Abnormal) Collected: 03/12/24 1716    Lab Status: Final result Specimen: Blood from Arm, Left Updated: 03/12/24 1732     WBC 6.87 Thousand/uL      RBC 5.23 Million/uL      Hemoglobin 16.3 g/dL      Hematocrit 47.9 %      MCV 92 fL      MCH 31.2 pg      MCHC 34.0 g/dL      RDW 14.4 %      MPV 9.1  fL      Platelets 326 Thousands/uL      nRBC 0 /100 WBCs      Neutrophils Relative 43 %      Immature Grans % 0 %      Lymphocytes Relative 44 %      Monocytes Relative 10 %      Eosinophils Relative 2 %      Basophils Relative 1 %      Neutrophils Absolute 2.97 Thousands/µL      Absolute Immature Grans 0.02 Thousand/uL      Absolute Lymphocytes 3.04 Thousands/µL      Absolute Monocytes 0.65 Thousand/µL      Eosinophils Absolute 0.15 Thousand/µL      Basophils Absolute 0.04 Thousands/µL                    XR chest 2 views   Final Result by Steven Hansen MD (03/12 2122)      Resolved left pneumothorax status post chest tube placement.            Workstation performed: [id number]         CTA ED chest PE study   Final Result by Tika Rincon MD (03/12 1955)      Several segmental and subsegmental emboli in all lobes of the right lung.      RV/LV diameter ratio at the upper limit of of normal at 1.0, borderline for right heart strain.      Large left pneumothorax with extensive atelectasis of the left lung with no mediastinal shift.      Small hiatal hernia.      Cholelithiasis.         I personally discussed this study with THANH GOLDMAN on 3/12/2024 7:54 PM.                        Workstation performed: [id number]         XR chest 2 views   ED Interpretation by Thanh Goldman DO (03/12 1823)   Left-sided pneumothorax.      XR chest 1 view portable    (Results Pending)              Procedures  ECG 12 Lead Documentation Only    Date/Time: 3/12/2024 4:39 PM    Performed by: Thanh Goldman DO  Authorized by: Thanh Goldman DO    ECG reviewed by me, the ED Provider: yes    Patient location:  ED  Previous ECG:     Previous ECG:  Unavailable  Rate:     ECG rate:  103    ECG rate assessment: tachycardic    Rhythm:     Rhythm: sinus tachycardia    Ectopy:     Ectopy: none    QRS:     QRS axis:  Right    QRS intervals:  Normal  Conduction:     Conduction: normal     ST segments:     ST segments:  Normal  T waves:     T waves: normal    Pre-Procedural Sedation    Performed by: Bobbi Goldman DO  Authorized by: Bobbi Goldman DO    Consent:     Consent obtained:  Verbal and written    Consent given by:  Patient    Risks discussed:  Allergic reaction, dysrhythmia, inadequate sedation, nausea, vomiting, respiratory compromise necessitating ventilatory assistance and intubation and prolonged hypoxia resulting in organ damage    Alternatives discussed:  Analgesia without sedation and regional anesthesia  [place] protocol:     Procedure explained and questions answered to patient or proxy's satisfaction: yes      Relevant documents present and verified: yes      Test results available and properly labeled: yes      Radiology Images displayed and confirmed.  If images not available, report reviewed: yes      Required blood products, implants, devices, and special equipment available: yes      Site/side marked: yes      Immediately prior to procedure a time out was called: yes      Patient identity confirmation method:  Verbally with patient and arm band  Indications:     Sedation purpose:  Chest tube placement    Procedure necessitating sedation performed by:  Physician performing sedation    Intended level of sedation:  Moderate (conscious sedation)  Pre-sedation assessment:     NPO status caution: urgency dictates proceeding with non-ideal NPO status      ASA classification: class 1 - normal, healthy patient      Neck mobility: normal      Mouth opening:  3 or more finger widths    Thyromental distance:  3 finger widths    Mallampati score:  II - soft palate, uvula, fauces visible    Pre-sedation assessments completed and reviewed: airway patency, cardiovascular function, hydration status, mental status, nausea/vomiting, pain level, respiratory function and temperature      History of difficult intubation: no      Pre-sedation assessment completed:  3/12/2024  8:00 PM  Procedural Sedation    Date/Time: 3/12/2024 8:13 PM    Performed by: Bbobi Goldman DO  Authorized by: Bobbi Goldman DO    Immediate pre-procedure details:     Reassessment: Patient reassessed immediately prior to procedure      Reviewed: vital signs      Verified: bag valve mask available, emergency equipment available, intubation equipment available, IV patency confirmed, oxygen available and suction available    Procedure details (see MAR for exact dosages):     Sedation start time:  3/12/2024 8:13 PM    Preoxygenation:  Nasal cannula    Sedation:  Ketamine    Analgesia:  None    Intra-procedure monitoring:  Blood pressure monitoring, cardiac monitor, continuous pulse oximetry, continuous capnometry, frequent LOC assessments and frequent vital sign checks    Intra-procedure events: none      Sedation end time:  3/12/2024 8:48 PM    Total sedation time (minutes):  35  Post-procedure details:     Post-sedation assessment completed:  3/12/2024 9:07 PM    Attendance: Constant attendance by certified staff until patient recovered      Recovery: Patient returned to pre-procedure baseline      Post-sedation assessments completed and reviewed: airway patency, cardiovascular function, hydration status, mental status, nausea/vomiting, pain level and respiratory function      Patient is stable for discharge or admission: yes      Patient tolerance:  Tolerated well, no immediate complications  Chest Tube    Date/Time: 3/12/2024 8:55 PM    Performed by: Bobbi Goldman DO  Authorized by: Bobbi Goldman DO    Patient location:  ED  Procedure performed by consultant: Dr. Bajwa.    Consent:     Consent obtained:  Verbal and written    Consent given by:  Patient    Risks discussed:  Bleeding, incomplete drainage, pain, infection, damage to surrounding structures and nerve damage    Alternatives discussed:  Delayed treatment, referral and observation  Universal protocol:      Procedure explained and questions answered to patient or proxy's satisfaction: yes      Relevant documents present and verified: yes      Test results available and properly labeled: yes      Radiology Images displayed and confirmed.  If images not available, report reviewed: yes      Required blood products, implants, devices, and special equipment available: yes      Site/side marked: yes      Immediately prior to procedure a time out was called: yes      Patient identity confirmed:  Verbally with patient and arm band  Pre-procedure details:     Skin preparation:  ChloraPrep    Preparation: Patient was prepped and draped in the usual sterile fashion    Indications:     Indications: pneumothroax    Sedation:     Sedation type:  Moderate (conscious) sedation (See separate Procedural Sedation form)  Anesthesia (see MAR for exact dosages):     Anesthesia method:  Local infiltration    Local anesthetic:  Lidocaine 1% WITH epi  Procedure details:     Placement location:  Lateral    Laterality:  Left    Approach:  Percutaneous    Thal-Quick Chest Tube Kit:  16 Fr    Tube size (Fr):  16    Ultrasound guidance: no      Tension pneumothorax: no      Needle Decompression: no      Tube connected to:  Suction    Drainage characteristics:  Air only    Suture material:  2-0 silk    Dressing:  4x4 sterile gauze  Post-procedure details:     Post-insertion x-ray findings: tube in good position      Patient tolerance of procedure:  Tolerated well, no immediate complications           ED Course  ED Course as of 03/12/24 2252   Tue Mar 12, 2024   1759 hs TnI 0hr: <2   1759 D-Dimer, Quant(!): 2.29  CTA chest ordered.    1821 BNP: 6   1841 Reviewed chest x-ray and there is a significant size left-sided pneumothorax, thought to be spontaneous as there was no recent traumatic event.  I updated patient at this time about x-ray findings and that we will still pursue CT scan to assess the size of the pneumothorax and to still rule out PE.   Discussed the need for a chest tube to evacuate the air which will cause lung reinflation.  Patient is agreeable to procedure and will do formal risk and benefit analysis and consent after patient returns from CT scan.  Patient placed on 2 L nasal cannula although O2 sat at rest is 90% and patient not in any acute distress.   1909 Reviewed CT scan and it is consistent with large left-sided pneumothorax.   1954 Prior to chest tube procedure, radiology called to discuss significant CT findings of not only large left pneumothorax but there is also evidence of multiple right-sided PE.  RV/LV ratio 1.0.  I did discuss this with patient and plan is to place chest tube and then start patient on heparin drip and consult ICU.   2047 Spoke with critical care attending, Dr. Carroll who will contact IR to see if patient is a candidate for any intervention in regards to the PE.  She will send AP down to evaluate patient.   2115 XR s/p chest tube placement confirms tube in left lung, with reexpansion of lung showing resolution of pneumothorax.   2145 Patient reassessed and appears more comfortable.  She is hemodynamically stable and has no complaints at this time.  Patient is excepted to stepdown level 1 under critical care service.  Heparin to be started now.             HEART Risk Score      Flowsheet Row Most Recent Value   Heart Score Risk Calculator    History 0 Filed at: 03/12/2024 1758   ECG 0 Filed at: 03/12/2024 1758   Age 1 Filed at: 03/12/2024 1758   Risk Factors 1 Filed at: 03/12/2024 1758   Troponin 0 Filed at: 03/12/2024 1758   HEART Score 2 Filed at: 03/12/2024 1758                  PERC Rule for PE      Flowsheet Row Most Recent Value   PERC Rule for PE    Age >=50 1 Filed at: 03/12/2024 1727   HR >=100 1 Filed at: 03/12/2024 1727   O2 Sat on room air < 95% 0 Filed at: 03/12/2024 1727   History of PE or DVT 1 Filed at: 03/12/2024 1727   Recent trauma or surgery 0 Filed at: 03/12/2024 1727   Hemoptysis 0 Filed at:  03/12/2024 1727   Exogenous estrogen 0 Filed at: 03/12/2024 1727   Unilateral leg swelling 0 Filed at: 03/12/2024 1727   PERC Rule for PE Results 3 Filed at: 03/12/2024 1727                SBIRT 22yo+      Flowsheet Row Most Recent Value   Initial Alcohol Screen: US AUDIT-C     1. How often do you have a drink containing alcohol? 0 Filed at: 03/12/2024 1808   2. How many drinks containing alcohol do you have on a typical day you are drinking?  0 Filed at: 03/12/2024 1808   3a. Male UNDER 65: How often do you have five or more drinks on one occasion? 0 Filed at: 03/12/2024 1808   3b. FEMALE Any Age, or MALE 65+: How often do you have 4 or more drinks on one occassion? 0 Filed at: 03/12/2024 1808   Audit-C Score 0 Filed at: 03/12/2024 1808   RAMIN: How many times in the past year have you...    Used an illegal drug or used a prescription medication for non-medical reasons? Never Filed at: 03/12/2024 1808            Wells' Criteria for PE      Flowsheet Row Most Recent Value   Wells' Criteria for PE    Clinical signs and symptoms of DVT 0 Filed at: 03/12/2024 1728   PE is primary diagnosis or equally likely 0 Filed at: 03/12/2024 1728   HR >100 1.5 Filed at: 03/12/2024 1728   Immobilization at least 3 days or Surgery in the previous 4 weeks 0 Filed at: 03/12/2024 1728   Previous, objectively diagnosed PE or DVT 1.5 Filed at: 03/12/2024 1728   Hemoptysis 0 Filed at: 03/12/2024 1728   Malignancy with treatment within 6 months or palliative 0 Filed at: 03/12/2024 1728   Wells' Criteria Total 3 Filed at: 03/12/2024 1728                  Medical Decision Making  58-year-old female presents to the ED for acute chest tightness, exertional dyspnea since yesterday in the setting of recent productive cough, runny nose and congestion over the past 2 weeks.  I suspect bronchospasm causing her symptoms secondary to bronchitis, possible pneumonia.  Given her age, mild tachycardia, history of prior PE, will evaluate with cardiac labs  to rule out cardiac abnormality as well as recurrent PE with D-dimer.  Will provide aspirin, albuterol/Atrovent breathing treatment, IV fluids.    Amount and/or Complexity of Data Reviewed  Labs: ordered. Decision-making details documented in ED Course.  Radiology: ordered and independent interpretation performed. Decision-making details documented in ED Course.  ECG/medicine tests: ordered and independent interpretation performed. Decision-making details documented in ED Course.    Risk  OTC drugs.  Prescription drug management.  Decision regarding hospitalization.        PESI  Age: 58 years old  Sex: 0  History of Cancer: 0  History of Heart Failure: 0  History of Chronic Lung Disease: 0  Heart rate greater than or equal to 110: 20  Systolic BP < 100 mmH  Respiratory rate greater than or equal to 30: 0  Temperature <36°C/96.8°F: 0  Altered Mental Status (Disorientation, lethargy, stupor, or coma): 0  O2 saturation <90%: 0  PESI Score Results: 76        Disposition  Final diagnoses:   Spontaneous pneumothorax - LEFT sided   Viral URI with cough   Pulmonary emboli (HCC) - Right sided   Chest pain   Exertional dyspnea     Time reflects when diagnosis was documented in both MDM as applicable and the Disposition within this note       Time User Action Codes Description Comment    3/12/2024  9:03 PM Bobbi Goldman Add [J93.83] Spontaneous pneumothorax     3/12/2024  9:03 PM Bobbi Goldman Modify [J93.83] Spontaneous pneumothorax LEFT sided    3/12/2024  9:03 PM Bobbi Goldman Add [J06.9] Viral URI with cough     3/12/2024  9:03 PM Bobbi Goldman Add [I26.99] Pulmonary emboli (HCC)     3/12/2024  9:03 PM Bobbi Goldman Modify [I26.99] Pulmonary emboli (HCC) Right sided    3/12/2024  9:03 PM Bobbi Goldman Add [R07.9] Chest pain     3/12/2024  9:03 PM Bobbi Goldman Add [R06.09] Exertional dyspnea           ED Disposition       ED Disposition   Admit    Condition   Stable    Date/Time   Tue Mar 12, 2024  0925    Comment   Case was discussed with critical care and the patient's admission status was agreed to be Admission Status: inpatient status to the service of Dr. Kate Carroll .               Follow-up Information    None         Patient's Medications   Discharge Prescriptions    No medications on file       No discharge procedures on file.    PDMP Review       None            ED Provider  Electronically Signed by             Bobbi Goldman DO  03/12/24 7928

## 2024-03-13 ENCOUNTER — APPOINTMENT (INPATIENT)
Dept: NON INVASIVE DIAGNOSTICS | Facility: HOSPITAL | Age: 59
DRG: 199 | End: 2024-03-13
Payer: COMMERCIAL

## 2024-03-13 ENCOUNTER — APPOINTMENT (INPATIENT)
Dept: RADIOLOGY | Facility: HOSPITAL | Age: 59
DRG: 199 | End: 2024-03-13
Payer: COMMERCIAL

## 2024-03-13 ENCOUNTER — APPOINTMENT (INPATIENT)
Dept: VASCULAR ULTRASOUND | Facility: HOSPITAL | Age: 59
DRG: 199 | End: 2024-03-13
Payer: COMMERCIAL

## 2024-03-13 PROBLEM — R06.89 INSUFFICIENCY, RESPIRATORY, ACUTE: Status: RESOLVED | Noted: 2024-03-12 | Resolved: 2024-03-13

## 2024-03-13 LAB
ANION GAP SERPL CALCULATED.3IONS-SCNC: 7 MMOL/L (ref 4–13)
AORTIC ROOT: 3.1 CM
APICAL FOUR CHAMBER EJECTION FRACTION: 66 %
APTT PPP: 119 SECONDS (ref 23–37)
APTT PPP: 71 SECONDS (ref 23–37)
APTT PPP: 72 SECONDS (ref 23–37)
ASCENDING AORTA: 3.6 CM
ATRIAL RATE: 103 BPM
AV LVOT MEAN GRADIENT: 2 MMHG
AV LVOT PEAK GRADIENT: 2 MMHG
BASOPHILS # BLD AUTO: 0.05 THOUSANDS/ÂΜL (ref 0–0.1)
BASOPHILS NFR BLD AUTO: 1 % (ref 0–1)
BSA FOR ECHO PROCEDURE: 1.68 M2
BUN SERPL-MCNC: 9 MG/DL (ref 5–25)
CA-I BLD-SCNC: 1.14 MMOL/L (ref 1.12–1.32)
CALCIUM SERPL-MCNC: 8.6 MG/DL (ref 8.4–10.2)
CHLORIDE SERPL-SCNC: 112 MMOL/L (ref 96–108)
CHOLEST SERPL-MCNC: 187 MG/DL
CO2 SERPL-SCNC: 21 MMOL/L (ref 21–32)
CREAT SERPL-MCNC: 0.64 MG/DL (ref 0.6–1.3)
DOP CALC LVOT PEAK VEL VTI: 14.3 CM
DOP CALC LVOT PEAK VEL: 0.75 M/S
E WAVE DECELERATION TIME: 135 MS
E/A RATIO: 0.99
EOSINOPHIL # BLD AUTO: 0.01 THOUSAND/ÂΜL (ref 0–0.61)
EOSINOPHIL NFR BLD AUTO: 0 % (ref 0–6)
ERYTHROCYTE [DISTWIDTH] IN BLOOD BY AUTOMATED COUNT: 14.4 % (ref 11.6–15.1)
EST. AVERAGE GLUCOSE BLD GHB EST-MCNC: 117 MG/DL
FRACTIONAL SHORTENING: 35 (ref 28–44)
GFR SERPL CREATININE-BSD FRML MDRD: 100 ML/MIN/1.73SQ M
GLUCOSE SERPL-MCNC: 99 MG/DL (ref 65–140)
HBA1C MFR BLD: 5.7 %
HCT VFR BLD AUTO: 43.1 % (ref 34.8–46.1)
HDLC SERPL-MCNC: 50 MG/DL
HGB BLD-MCNC: 14.3 G/DL (ref 11.5–15.4)
IMM GRANULOCYTES # BLD AUTO: 0.04 THOUSAND/UL (ref 0–0.2)
IMM GRANULOCYTES NFR BLD AUTO: 0 % (ref 0–2)
INR PPP: 1.05 (ref 0.84–1.19)
INTERVENTRICULAR SEPTUM IN DIASTOLE (PARASTERNAL SHORT AXIS VIEW): 1.1 CM
INTERVENTRICULAR SEPTUM: 1.1 CM (ref 0.6–1.1)
LA/AORTA RATIO 2D: 0.97
LAAS-AP2: 7.8 CM2
LAAS-AP4: 10.9 CM2
LACTATE SERPL-SCNC: 1.1 MMOL/L (ref 0.5–2)
LDLC SERPL CALC-MCNC: 125 MG/DL (ref 0–100)
LEFT ATRIUM SIZE: 3 CM
LEFT ATRIUM VOLUME (MOD BIPLANE): 19 ML
LEFT ATRIUM VOLUME INDEX (MOD BIPLANE): 11.3 ML/M2
LEFT INTERNAL DIMENSION IN SYSTOLE: 2.4 CM (ref 2.1–4)
LEFT VENTRICULAR INTERNAL DIMENSION IN DIASTOLE: 3.7 CM (ref 3.5–6)
LEFT VENTRICULAR POSTERIOR WALL IN END DIASTOLE: 0.9 CM
LEFT VENTRICULAR STROKE VOLUME: 37 ML
LVSV (TEICH): 37 ML
LYMPHOCYTES # BLD AUTO: 2.56 THOUSANDS/ÂΜL (ref 0.6–4.47)
LYMPHOCYTES NFR BLD AUTO: 26 % (ref 14–44)
MAGNESIUM SERPL-MCNC: 2.1 MG/DL (ref 1.9–2.7)
MCH RBC QN AUTO: 30.4 PG (ref 26.8–34.3)
MCHC RBC AUTO-ENTMCNC: 33.2 G/DL (ref 31.4–37.4)
MCV RBC AUTO: 92 FL (ref 82–98)
MONOCYTES # BLD AUTO: 0.8 THOUSAND/ÂΜL (ref 0.17–1.22)
MONOCYTES NFR BLD AUTO: 8 % (ref 4–12)
MV E'TISSUE VEL-SEP: 10 CM/S
MV PEAK A VEL: 0.72 M/S
MV PEAK E VEL: 71 CM/S
MV STENOSIS PRESSURE HALF TIME: 40 MS
MV VALVE AREA P 1/2 METHOD: 5.5
NEUTROPHILS # BLD AUTO: 6.26 THOUSANDS/ÂΜL (ref 1.85–7.62)
NEUTS SEG NFR BLD AUTO: 65 % (ref 43–75)
NONHDLC SERPL-MCNC: 137 MG/DL
NRBC BLD AUTO-RTO: 0 /100 WBCS
P AXIS: 72 DEGREES
PHOSPHATE SERPL-MCNC: 2.8 MG/DL (ref 2.7–4.5)
PLATELET # BLD AUTO: 274 THOUSANDS/UL (ref 149–390)
PMV BLD AUTO: 8.9 FL (ref 8.9–12.7)
POTASSIUM SERPL-SCNC: 3.8 MMOL/L (ref 3.5–5.3)
PR INTERVAL: 166 MS
PROCALCITONIN SERPL-MCNC: <0.05 NG/ML
PROTHROMBIN TIME: 14.4 SECONDS (ref 11.6–14.5)
QRS AXIS: 100 DEGREES
QRSD INTERVAL: 72 MS
QT INTERVAL: 358 MS
QTC INTERVAL: 468 MS
RBC # BLD AUTO: 4.7 MILLION/UL (ref 3.81–5.12)
RIGHT VENTRICLE ID DIMENSION: 3.3 CM
SL CV LV EF: 60
SL CV PED ECHO LEFT VENTRICLE DIASTOLIC VOLUME (MOD BIPLANE) 2D: 57 ML
SL CV PED ECHO LEFT VENTRICLE SYSTOLIC VOLUME (MOD BIPLANE) 2D: 20 ML
SODIUM SERPL-SCNC: 140 MMOL/L (ref 135–147)
T WAVE AXIS: 63 DEGREES
TRICUSPID ANNULAR PLANE SYSTOLIC EXCURSION: 1.8 CM
TRIGL SERPL-MCNC: 59 MG/DL
VENTRICULAR RATE: 103 BPM
WBC # BLD AUTO: 9.72 THOUSAND/UL (ref 4.31–10.16)

## 2024-03-13 PROCEDURE — 85025 COMPLETE CBC W/AUTO DIFF WBC: CPT | Performed by: NURSE PRACTITIONER

## 2024-03-13 PROCEDURE — 93010 ELECTROCARDIOGRAM REPORT: CPT | Performed by: INTERNAL MEDICINE

## 2024-03-13 PROCEDURE — 71045 X-RAY EXAM CHEST 1 VIEW: CPT

## 2024-03-13 PROCEDURE — 93970 EXTREMITY STUDY: CPT

## 2024-03-13 PROCEDURE — 83036 HEMOGLOBIN GLYCOSYLATED A1C: CPT | Performed by: NURSE PRACTITIONER

## 2024-03-13 PROCEDURE — 93306 TTE W/DOPPLER COMPLETE: CPT

## 2024-03-13 PROCEDURE — 85730 THROMBOPLASTIN TIME PARTIAL: CPT | Performed by: NURSE PRACTITIONER

## 2024-03-13 PROCEDURE — 84145 PROCALCITONIN (PCT): CPT | Performed by: NURSE PRACTITIONER

## 2024-03-13 PROCEDURE — 80061 LIPID PANEL: CPT | Performed by: NURSE PRACTITIONER

## 2024-03-13 PROCEDURE — 83605 ASSAY OF LACTIC ACID: CPT | Performed by: NURSE PRACTITIONER

## 2024-03-13 PROCEDURE — 93970 EXTREMITY STUDY: CPT | Performed by: SURGERY

## 2024-03-13 PROCEDURE — 83735 ASSAY OF MAGNESIUM: CPT | Performed by: NURSE PRACTITIONER

## 2024-03-13 PROCEDURE — 93306 TTE W/DOPPLER COMPLETE: CPT | Performed by: INTERNAL MEDICINE

## 2024-03-13 PROCEDURE — 84100 ASSAY OF PHOSPHORUS: CPT | Performed by: NURSE PRACTITIONER

## 2024-03-13 PROCEDURE — 82330 ASSAY OF CALCIUM: CPT | Performed by: NURSE PRACTITIONER

## 2024-03-13 PROCEDURE — 80048 BASIC METABOLIC PNL TOTAL CA: CPT | Performed by: NURSE PRACTITIONER

## 2024-03-13 PROCEDURE — 85610 PROTHROMBIN TIME: CPT | Performed by: NURSE PRACTITIONER

## 2024-03-13 RX ORDER — POTASSIUM CHLORIDE 20 MEQ/1
20 TABLET, EXTENDED RELEASE ORAL ONCE
Status: COMPLETED | OUTPATIENT
Start: 2024-03-13 | End: 2024-03-13

## 2024-03-13 RX ADMIN — KETOROLAC TROMETHAMINE 15 MG: 30 INJECTION, SOLUTION INTRAMUSCULAR; INTRAVENOUS at 22:42

## 2024-03-13 RX ADMIN — Medication 2 TABLET: at 21:19

## 2024-03-13 RX ADMIN — LIDOCAINE 5% 1 PATCH: 700 PATCH TOPICAL at 08:43

## 2024-03-13 RX ADMIN — KETOROLAC TROMETHAMINE 15 MG: 30 INJECTION, SOLUTION INTRAMUSCULAR; INTRAVENOUS at 01:12

## 2024-03-13 RX ADMIN — POTASSIUM CHLORIDE 20 MEQ: 1500 TABLET, EXTENDED RELEASE ORAL at 08:43

## 2024-03-13 RX ADMIN — Medication 2 TABLET: at 16:34

## 2024-03-13 RX ADMIN — Medication 2 TABLET: at 08:43

## 2024-03-13 RX ADMIN — SODIUM CHLORIDE, SODIUM GLUCONATE, SODIUM ACETATE, POTASSIUM CHLORIDE, MAGNESIUM CHLORIDE, SODIUM PHOSPHATE, DIBASIC, AND POTASSIUM PHOSPHATE 50 ML/HR: .53; .5; .37; .037; .03; .012; .00082 INJECTION, SOLUTION INTRAVENOUS at 00:28

## 2024-03-13 RX ADMIN — HEPARIN SODIUM 15 UNITS/KG/HR: 10000 INJECTION, SOLUTION INTRAVENOUS at 21:19

## 2024-03-13 RX ADMIN — ACETAMINOPHEN 1000 MG: 10 INJECTION INTRAVENOUS at 00:31

## 2024-03-13 RX ADMIN — CHLORHEXIDINE GLUCONATE 0.12% ORAL RINSE 15 ML: 1.2 LIQUID ORAL at 00:30

## 2024-03-13 RX ADMIN — Medication 2 TABLET: at 12:39

## 2024-03-13 RX ADMIN — CHLORHEXIDINE GLUCONATE 0.12% ORAL RINSE 15 ML: 1.2 LIQUID ORAL at 08:43

## 2024-03-13 RX ADMIN — LIDOCAINE 5% 1 PATCH: 700 PATCH TOPICAL at 00:29

## 2024-03-13 NOTE — ASSESSMENT & PLAN NOTE
As evidenced by tachycardia and tachypnea, respiratory insufficiency   Likely secondary to pneumothorax  No leukocytosis, no fever, no organ dysfunction       Plan:   Will check lactate and procalcitonin   No indication for abx

## 2024-03-13 NOTE — PLAN OF CARE
Problem: PAIN - ADULT  Goal: Verbalizes/displays adequate comfort level or baseline comfort level  Description: Interventions:  - Encourage patient to monitor pain and request assistance  - Assess pain using appropriate pain scale  - Administer analgesics based on type and severity of pain and evaluate response  - Implement non-pharmacological measures as appropriate and evaluate response  - Consider cultural and social influences on pain and pain management  - Notify physician/advanced practitioner if interventions unsuccessful or patient reports new pain  Outcome: Progressing     Problem: INFECTION - ADULT  Goal: Absence or prevention of progression during hospitalization  Description: INTERVENTIONS:  - Assess and monitor for signs and symptoms of infection  - Monitor lab/diagnostic results  - Monitor all insertion sites, i.e. indwelling lines, tubes, and drains  - Monitor endotracheal if appropriate and nasal secretions for changes in amount and color  - [place] appropriate cooling/warming therapies per order  - Administer medications as ordered  - Instruct and encourage patient and family to use good hand hygiene technique  - Identify and instruct in appropriate isolation precautions for identified infection/condition  Outcome: Progressing  Goal: Absence of fever/infection during neutropenic period  Description: INTERVENTIONS:  - Monitor WBC    Outcome: Progressing     Problem: SAFETY ADULT  Goal: Patient will remain free of falls  Description: INTERVENTIONS:  - Educate patient/family on patient safety including physical limitations  - Instruct patient to call for assistance with activity   - Consult OT/PT to assist with strengthening/mobility   - Keep Call bell within reach  - Keep bed low and locked with side rails adjusted as appropriate  - Keep care items and personal belongings within reach  - Initiate and maintain comfort rounds  - Make Fall Risk Sign visible to staff  - Apply yellow socks and bracelet  for high fall risk patients  - Consider moving patient to room near nurses station  Outcome: Progressing  Goal: Maintain or return to baseline ADL function  Description: INTERVENTIONS:  -  Assess patient's ability to carry out ADLs; assess patient's baseline for ADL function and identify physical deficits which impact ability to perform ADLs (bathing, care of mouth/teeth, toileting, grooming, dressing, etc.)  - Assess/evaluate cause of self-care deficits   - Assess range of motion  - Assess patient's mobility; develop plan if impaired  - Assess patient's need for assistive devices and provide as appropriate  - Encourage maximum independence but intervene and supervise when necessary  - Involve family in performance of ADLs  - Assess for home care needs following discharge   - Consider OT consult to assist with ADL evaluation and planning for discharge  - Provide patient education as appropriate  Outcome: Progressing  Goal: Maintains/Returns to pre admission functional level  Description: INTERVENTIONS:  - Perform AM-PAC 6 Click Basic Mobility/ Daily Activity assessment daily.  - Set and communicate daily mobility goal to care team and patient/family/caregiver.   - Collaborate with rehabilitation services on mobility goals if consulted  - Out of bed for toileting  - Record patient progress and toleration of activity level   Outcome: Progressing     Problem: DISCHARGE PLANNING  Goal: Discharge to home or other facility with appropriate resources  Description: INTERVENTIONS:  - Identify barriers to discharge w/patient and caregiver  - Arrange for needed discharge resources and transportation as appropriate  - Identify discharge learning needs (meds, wound care, etc.)  - Arrange for interpretive services to assist at discharge as needed  - Refer to Case Management Department for coordinating discharge planning if the patient needs post-hospital services based on physician/advanced practitioner order or complex needs  related to functional status, cognitive ability, or social support system  Outcome: Progressing     Problem: Knowledge Deficit  Goal: Patient/family/caregiver demonstrates understanding of disease process, treatment plan, medications, and discharge instructions  Description: Complete learning assessment and assess knowledge base.  Interventions:  - Provide teaching at level of understanding  - Provide teaching via preferred learning methods  Outcome: Progressing     Problem: CARDIOVASCULAR - ADULT  Goal: Maintains optimal cardiac output and hemodynamic stability  Description: INTERVENTIONS:  - Monitor I/O, vital signs and rhythm  - Monitor for S/S and trends of decreased cardiac output  - Administer and titrate ordered vasoactive medications to optimize hemodynamic stability  - Assess quality of pulses, skin color and temperature  - Assess for signs of decreased coronary artery perfusion  - Instruct patient to report change in severity of symptoms  Outcome: Progressing  Goal: Absence of cardiac dysrhythmias or at baseline rhythm  Description: INTERVENTIONS:  - Continuous cardiac monitoring, vital signs, obtain 12 lead EKG if ordered  - Administer antiarrhythmic and heart rate control medications as ordered  - Monitor electrolytes and administer replacement therapy as ordered  Outcome: Progressing     Problem: RESPIRATORY - ADULT  Goal: Achieves optimal ventilation and oxygenation  Description: INTERVENTIONS:  - Assess for changes in respiratory status  - Assess for changes in mentation and behavior  - Position to facilitate oxygenation and minimize respiratory effort  - Oxygen administered by appropriate delivery if ordered  - Initiate smoking cessation education as indicated  - Encourage broncho-pulmonary hygiene including cough, deep breathe, Incentive Spirometry  - Assess the need for suctioning and aspirate as needed  - Assess and instruct to report SOB or any respiratory difficulty  - Respiratory Therapy  support as indicated  Outcome: Progressing     Problem: SKIN/TISSUE INTEGRITY - ADULT  Goal: Skin Integrity remains intact(Skin Breakdown Prevention)  Description: Assess:  -Inspect skin when repositioning, toileting, and assisting with ADLS  -Assess extremities for adequate circulation and sensation     Bed Management:  -Have minimal linens on bed & keep smooth, unwrinkled  -Change linens as needed when moist or perspiring    Toileting:  -Offer bedside commode    Activity:  -Encourage activity and walks on unit  -Encourage or provide ROM exercises   -Use appropriate equipment to lift or move patient in bed    Skin Care:  -Avoid use of baby powder, tape, friction and shearing, hot water or constrictive clothing  -Do not massage red bony areas      Outcome: Progressing  Goal: Incision(s), wounds(s) or drain site(s) healing without S/S of infection  Description: INTERVENTIONS  - Assess and document dressing, incision, wound bed, drain sites and surrounding tissue  - Provide patient and family education  Outcome: Progressing  Goal: Pressure injury heals and does not worsen  Description: Interventions:  - Implement low air loss mattress or specialty surface (Criteria met)  - Apply silicone foam dressing  - Apply fecal or urinary incontinence containment device   - Utilize friction reducing device or surface for transfers   - Consider nutrition services referral as needed  Outcome: Progressing

## 2024-03-13 NOTE — ASSESSMENT & PLAN NOTE
"Multifactorial in the setting of acute left sided pneumothorax and segmental/subsegmental PE  Symptoms likely predominantly secondary to pneumothorax  CTA notable for \"Several segmental and subsegmental emboli in all lobes of the right lung.  RV/LV diameter ratio at the upper limit of of normal at 1.0, borderline for right heart strain. Large left pneumothorax with extensive atelectasis of the left lung with no mediastinal shift.\"  Left CT placed in ED with re-expansion of the lung  No evidence of end organ damage, negative troponin, normal BNP    Plan:  O2 support with traditional nasal cannula   Maintain left lateral chest tube at -20 mmhg  Monitor air leak for readiness to wean to water seal   Pain management with lidocaine patch, PRN tylenol and PRN toradol   Initiate heparin gtt VTE   Check echocardiogram   Need to consider lifetime anticoagulation as this is her second PE in her life time.  First was provoked in the setting of cosmetic surgery   Patient states that she previously had thrombosis testing that was negative   "

## 2024-03-13 NOTE — UTILIZATION REVIEW
NOTIFICATION OF INPATIENT ADMISSION   AUTHORIZATION REQUEST   SERVICING FACILITY:   [place] [address]  Tax ID: [id number]  NPI: [id number] ATTENDING PROVIDER:  Attending Name and NPI#: Bishop Forman Do [[id number]]  Address: [address] [place] [address]  Phone: [phone]     ADMISSION INFORMATION:  Place of Service: Inpatient Acute [facility]  Place of Service Code: 21  Inpatient Admission Date/Time: 3/12/24  9:04 PM  Discharge Date/Time: No discharge date for patient encounter.  Admitting Diagnosis Code/Description:  Spontaneous pneumothorax [J93.83]  Chest pain [R07.9]  SOB (shortness of breath) [R06.02]  Exertional dyspnea [R06.09]  Viral URI with cough [J06.9]  Pulmonary emboli (HCC) [I26.99]     UTILIZATION REVIEW CONTACT:  Casie Bella, Utilization   Network Utilization Review Department  Phone: [phone]  Fax [phone]  Email: [email or web address]  Contact for approvals/pending authorizations, clinical reviews, and discharge.     PHYSICIAN ADVISORY SERVICES:  Medical Necessity Denial & Xead-jz-Yrsj Review  Phone: [phone]  Fax: [phone]  Email: [email or web address]     DISCHARGE SUPPORT TEAM:  For Patients Discharge Needs & Updates  Phone: [phone] opt. 2 Fax: [phone]  Email: [email or web address]

## 2024-03-13 NOTE — PROGRESS NOTES
"[facility]  Progress Note  Name: Lazara Ivy I  MRN: [id number]  Unit/Bed#: ICU 07 I Date of Admission: 3/12/2024   Date of Service: 3/13/2024 I Hospital Day: 1    Assessment/Plan   * Insufficiency, respiratory, acute  Assessment & Plan  Multifactorial in the setting of acute left sided pneumothorax and segmental/subsegmental PE  Symptoms likely predominantly secondary to pneumothorax  CTA notable for \"Several segmental and subsegmental emboli in all lobes of the right lung.  RV/LV diameter ratio at the upper limit of of normal at 1.0, borderline for right heart strain. Large left pneumothorax with extensive atelectasis of the left lung with no mediastinal shift.\"  Left CT placed in ED with re-expansion of the lung  No evidence of end organ damage, negative troponin, normal BNP    Plan:  O2 support with traditional nasal cannula   Maintain left lateral chest tube at -20 mmhg  Monitor air leak for readiness to wean to water seal   Pain management with lidocaine patch, PRN tylenol and PRN toradol   Continue heparin gtt VTE   Check echocardiogram   Need to consider lifetime anticoagulation as this is her second PE in her life time.  First was provoked in the setting of cosmetic surgery   Patient states that she previously had thrombosis testing that was negative     Pneumothorax, left  Assessment & Plan  Likely spontaneous in the setting prolonged infectious upper respiratory course   Noted on CTA   Left chest tube placed at bedside in ED with re-expansion of the left lung    Plan:  Maintain CT at -20 mmHg  Monitor air leak for readiness to wean     Pulmonary embolism (HCC)  Assessment & Plan  Patient has a history of previous pulmonary embolism in the setting of cosmetic surgery.  She was on coumadin for 6 months   She states that she has been significantly more sedentary over the last several months as she has been working from home.  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 RV/LV diameter ratio at the upper limit of of normal at 1.0, borderline for right heart strain. Large left pneumothorax with extensive atelectasis of the left lung with no mediastinal shift.\"  No end organ damage  No troponin leak   Bnp normal     Plan:  Heparin gtt VTE protocol initiated  Will check echocardiogram   Transition to DOAC when CT removed  May need to consider life time anticoagulation given second PE   Patient states that she had a thrombosis workup with the previous PE that was negative     SIRS (systemic inflammatory response syndrome) (HCC)  Assessment & Plan  As evidenced by tachycardia and tachypnea, respiratory insufficiency   Likely secondary to pneumothorax  No leukocytosis, no fever, no organ dysfunction       Plan:   Will check lactate and procalcitonin   No indication for abx              Disposition: Stepdown Level 1    ICU Core Measures     A: Assess, Prevent, and Manage Pain Has pain been assessed? Yes  Need for changes to pain regimen? No   B: Both SAT/SAT  N/A   C: Choice of Sedation RASS Goal: 0 Alert and Calm  Need for changes to sedation or analgesia regimen? No   D: Delirium CAM-ICU: Negative   E: Early Mobility  Plan for early mobility? Yes   F: Family Engagement Plan for family engagement today? Yes         Prophylaxis:  VTE VTE covered by:  heparin (porcine), Intravenous, 18 Units/kg/hr at 03/12/24 0423       Stress Ulcer  not ordered         Significant 24hr Events     24hr events: admitted last evening for segmental and subsegmental PEs and left sided pneumothorax. Left lateral chest tube placed in ED with re-expansion of the left lung.  No acute events since admission.     Subjective "   Review of Systems   Objective                            Vitals I/O      Most Recent Min/Max in 24hrs   Temp 98.2 °F (36.8 °C) Temp  Min: 98 °F (36.7 °C)  Max: 98.2 °F (36.8 °C)   Pulse (!) 108 Pulse  Min: 101  Max: 145   Resp (!) 27 Resp  Min: 17  Max: 34   /87 BP  Min: 123/74  Max: 197/109   O2 Sat 95 % SpO2  Min: 95 %  Max: 100 %    No intake or output data in the 24 hours ending 03/13/24 0136    Diet Regular; Regular House    Invasive Monitoring           Physical Exam   Physical Exam  Eyes:      General: Lids are normal.      Extraocular Movements: Extraocular movements intact.      Conjunctiva/sclera: Conjunctivae normal.   Skin:     General: Skin is warm and dry.   HENT:      Head: Normocephalic and atraumatic.   Neck:      Trachea: Trachea normal.   Cardiovascular:      Rate and Rhythm: Tachycardia present.      Pulses: Normal pulses.   Musculoskeletal:      Cervical back: Normal range of motion.      Right lower leg: No edema.      Left lower leg: No edema.   Abdominal:      Palpations: Abdomen is soft.      Tenderness: There is no abdominal tenderness.   Constitutional:       General: She is awake.      Appearance: She is well-developed, well-groomed and normal weight.      Interventions: Nasal cannula in place.   Pulmonary:      Effort: Pulmonary effort is normal.      Breath sounds: Normal breath sounds.      Comments: L lateral chest tube to -20 mmHg, + air leak   Psychiatric:         Behavior: Behavior is cooperative.   Neurological:      General: No focal deficit present.      Mental Status: She is alert.      GCS: GCS eye subscore is 4. GCS verbal subscore is 5. GCS motor subscore is 6.      Sensory: Sensation is intact.            Diagnostic Studies      EKG: ST   Imaging:   XR chest 2 views   Final Result      Resolved left pneumothorax status post chest tube placement.            Workstation performed: [id number]         CTA ED chest PE study   Final Result      Several segmental and  subsegmental emboli in all lobes of the right lung.      RV/LV diameter ratio at the upper limit of of normal at 1.0, borderline for right heart strain.      Large left pneumothorax with extensive atelectasis of the left lung with no mediastinal shift.      Small hiatal hernia.      Cholelithiasis.         I personally discussed this study with THANH MALONE on 3/12/2024 7:54 PM.                        Workstation performed: [id number]         XR chest 2 views   ED Interpretation   Left-sided pneumothorax.      XR chest 1 view portable    (Results Pending)         Medications:  Scheduled PRN   chlorhexidine, 15 mL, Q12H JOHANNY  lidocaine, 1 patch, Daily      ketorolac, 15 mg, Q6H PRN       Continuous    heparin (porcine), 3-30 Units/kg/hr (Order-Specific), Last Rate: 18 Units/kg/hr (03/12/24 2146)  multi-electrolyte, 50 mL/hr, Last Rate: 50 mL/hr (03/13/24 0028)         Labs:    CBC    Recent Labs     03/12/24  1716   WBC 6.87   HGB 16.3*   HCT 47.9*        BMP    Recent Labs     03/12/24  1716   SODIUM 139   K 3.7      CO2 24   AGAP 9   BUN 17   CREATININE 0.98   CALCIUM 9.6       Coags    Recent Labs     03/12/24  1716   INR 0.93   PTT 29        Additional Electrolytes  Recent Labs     03/12/24  1716   MG 2.2          Blood Gas    No recent results  No recent results LFTs  Recent Labs     03/12/24  1716   ALT 28   AST 13   ALKPHOS 73   ALB 4.6   TBILI 0.69       Infectious  Recent Labs     03/12/24  1716   PROCALCITONI <0.05     Glucose  Recent Labs     03/12/24  1716   GLUC 90               STEFANIE More

## 2024-03-13 NOTE — PLAN OF CARE
Problem: PAIN - ADULT  Goal: Verbalizes/displays adequate comfort level or baseline comfort level  Description: Interventions:  - Encourage patient to monitor pain and request assistance  - Assess pain using appropriate pain scale  - Administer analgesics based on type and severity of pain and evaluate response  - Implement non-pharmacological measures as appropriate and evaluate response  - Consider cultural and social influences on pain and pain management  - Notify physician/advanced practitioner if interventions unsuccessful or patient reports new pain  Outcome: Progressing     Problem: INFECTION - ADULT  Goal: Absence or prevention of progression during hospitalization  Description: INTERVENTIONS:  - Assess and monitor for signs and symptoms of infection  - Monitor lab/diagnostic results  - Monitor all insertion sites, i.e. indwelling lines, tubes, and drains  - Monitor endotracheal if appropriate and nasal secretions for changes in amount and color  - Wells appropriate cooling/warming therapies per order  - Administer medications as ordered  - Instruct and encourage patient and family to use good hand hygiene technique  - Identify and instruct in appropriate isolation precautions for identified infection/condition  Outcome: Progressing  Goal: Absence of fever/infection during neutropenic period  Description: INTERVENTIONS:  - Monitor WBC    Outcome: Progressing     Problem: SAFETY ADULT  Goal: Patient will remain free of falls  Description: INTERVENTIONS:  - Educate patient/family on patient safety including physical limitations  - Instruct patient to call for assistance with activity   - Consult OT/PT to assist with strengthening/mobility   - Keep Call bell within reach  - Keep bed low and locked with side rails adjusted as appropriate  - Keep care items and personal belongings within reach  - Initiate and maintain comfort rounds  - Make Fall Risk Sign visible to staff  - Offer Toileting every 2 Hours,  in advance of need  - Initiate/Maintain bed alarm    - Apply yellow socks and bracelet for high fall risk patients  - Consider moving patient to room near nurses station  Outcome: Progressing  Goal: Maintain or return to baseline ADL function  Description: INTERVENTIONS:  -  Assess patient's ability to carry out ADLs; assess patient's baseline for ADL function and identify physical deficits which impact ability to perform ADLs (bathing, care of mouth/teeth, toileting, grooming, dressing, etc.)  - Assess/evaluate cause of self-care deficits   - Assess range of motion  - Assess patient's mobility; develop plan if impaired  - Assess patient's need for assistive devices and provide as appropriate  - Encourage maximum independence but intervene and supervise when necessary  - Involve family in performance of ADLs  - Assess for home care needs following discharge   - Consider OT consult to assist with ADL evaluation and planning for discharge  - Provide patient education as appropriate  Outcome: Progressing  Goal: Maintains/Returns to pre admission functional level  Description: INTERVENTIONS:  - Perform AM-PAC 6 Click Basic Mobility/ Daily Activity assessment daily.  - Set and communicate daily mobility goal to care team and patient/family/caregiver.   - Collaborate with rehabilitation services on mobility goals if consulted  - Perform Range of Motion 2 times a day.  - Reposition patient every 2 hours.  - Dangle patient 2 times a day  - Stand patient 2 times a day  - Ambulate patient 2 times a day  - Out of bed to chair 2 times a day   - Out of bed for meals 2 times a day  - Out of bed for toileting  - Record patient progress and toleration of activity level   Outcome: Progressing     Problem: DISCHARGE PLANNING  Goal: Discharge to home or other facility with appropriate resources  Description: INTERVENTIONS:  - Identify barriers to discharge w/patient and caregiver  - Arrange for needed discharge resources and  transportation as appropriate  - Identify discharge learning needs (meds, wound care, etc.)  - Arrange for interpretive services to assist at discharge as needed  - Refer to Case Management Department for coordinating discharge planning if the patient needs post-hospital services based on physician/advanced practitioner order or complex needs related to functional status, cognitive ability, or social support system  Outcome: Progressing     Problem: Knowledge Deficit  Goal: Patient/family/caregiver demonstrates understanding of disease process, treatment plan, medications, and discharge instructions  Description: Complete learning assessment and assess knowledge base.  Interventions:  - Provide teaching at level of understanding  - Provide teaching via preferred learning methods  Outcome: Progressing     Problem: CARDIOVASCULAR - ADULT  Goal: Maintains optimal cardiac output and hemodynamic stability  Description: INTERVENTIONS:  - Monitor I/O, vital signs and rhythm  - Monitor for S/S and trends of decreased cardiac output  - Administer and titrate ordered vasoactive medications to optimize hemodynamic stability  - Assess quality of pulses, skin color and temperature  - Assess for signs of decreased coronary artery perfusion  - Instruct patient to report change in severity of symptoms  Outcome: Progressing  Goal: Absence of cardiac dysrhythmias or at baseline rhythm  Description: INTERVENTIONS:  - Continuous cardiac monitoring, vital signs, obtain 12 lead EKG if ordered  - Administer antiarrhythmic and heart rate control medications as ordered  - Monitor electrolytes and administer replacement therapy as ordered  Outcome: Progressing     Problem: RESPIRATORY - ADULT  Goal: Achieves optimal ventilation and oxygenation  Description: INTERVENTIONS:  - Assess for changes in respiratory status  - Assess for changes in mentation and behavior  - Position to facilitate oxygenation and minimize respiratory effort  - Oxygen  administered by appropriate delivery if ordered  - Initiate smoking cessation education as indicated  - Encourage broncho-pulmonary hygiene including cough, deep breathe, Incentive Spirometry  - Assess the need for suctioning and aspirate as needed  - Assess and instruct to report SOB or any respiratory difficulty  - Respiratory Therapy support as indicated  Outcome: Progressing     Problem: SKIN/TISSUE INTEGRITY - ADULT  Goal: Skin Integrity remains intact(Skin Breakdown Prevention)  Description: Assess:  -Perform Momo assessment every 2 hr  -Inspect skin when repositioning, toileting, and assisting with ADLS  -Assess extremities for adequate circulation and sensation     Bed Management:  -Have minimal linens on bed & keep smooth, unwrinkled  -Change linens as needed when moist or perspiring  -Avoid sitting or lying in one position for more than 2 hours while in bed  -Keep HOB at 30degrees     Toileting:  -Offer bedside commode  -Assess for incontinence every 2      Activity:  -Mobilize patient 3 times a day  -Encourage activity and walks on unit  -Encourage or provide ROM exercises   -Turn and reposition patient every 2 Hours  -Use appropriate equipment to lift or move patient in bed  -Instruct/ Assist with weight shifting every 2 when out of bed in chair  -Consider limitation of chair time 2 hour intervals    Skin Care:  -Avoid use of baby powder, tape, friction and shearing, hot water or constrictive clothing    -Do not massage red bony areas    Outcome: Progressing  Goal: Incision(s), wounds(s) or drain site(s) healing without S/S of infection  Description: INTERVENTIONS  - Assess and document dressing, incision, wound bed, drain sites and surrounding tissue  - Provide patient and family education  - Perform skin care/dressing changes every 2  Outcome: Progressing  Goal: Pressure injury heals and does not worsen  Description: Interventions:  - Implement low air loss mattress or specialty surface (Criteria  met)  - Apply silicone foam dressing  - Instruct/assist with weight shifting every  minutes when in chair   - Limit chair time to 2 hour intervals  - Use special pressure reducing interventions such as 2hr when in chair   - Apply fecal or urinary incontinence containment device   - Perform passive or active ROM every 2 hr  - Turn and reposition patient & offload bony prominences every 2 hours   - Utilize friction reducing device or surface for transfers   -  - Consider nutrition services referral as needed  Outcome: Progressing

## 2024-03-13 NOTE — ASSESSMENT & PLAN NOTE
Uncertain etiology- question increased coughing  Chest tube to water seal on 3/13  Chest tube removed this AM by pulmonology   CXR - no ptx, clear lungs

## 2024-03-13 NOTE — ASSESSMENT & PLAN NOTE
Likely spontaneous in the setting prolonged infectious upper respiratory course   Noted on CTA   Left chest tube placed at bedside in ED with re-expansion of the left lung    Plan:  Maintain CT at -20 mmHg  Monitor air leak for readiness to wean

## 2024-03-13 NOTE — H&P
"[facility]  H&P  Name: Lazara Ivy 58 y.o. female I MRN: [id number]  Unit/Bed#: FT 03 I Date of Admission: 3/12/2024   Date of Service: 3/12/2024 I Hospital Day: 0      Assessment/Plan   * Insufficiency, respiratory, acute  Assessment & Plan  Multifactorial in the setting of acute left sided pneumothorax and segmental/subsegmental PE  Symptoms likely predominantly secondary to pneumothorax  CTA notable for \"Several segmental and subsegmental emboli in all lobes of the right lung.  RV/LV diameter ratio at the upper limit of of normal at 1.0, borderline for right heart strain. Large left pneumothorax with extensive atelectasis of the left lung with no mediastinal shift.\"  Left CT placed in ED with re-expansion of the lung  No evidence of end organ damage, negative troponin, normal BNP    Plan:  O2 support with traditional nasal cannula   Maintain left lateral chest tube at -20 mmhg  Monitor air leak for readiness to wean to water seal   Pain management with lidocaine patch, PRN tylenol and PRN toradol   Initiate heparin gtt VTE   Check echocardiogram   Need to consider lifetime anticoagulation as this is her second PE in her life time.  First was provoked in the setting of cosmetic surgery   Patient states that she previously had thrombosis testing that was negative     Pneumothorax, left  Assessment & Plan  Likely spontaneous in the setting prolonged infectious upper respiratory course   Noted on CTA   Left chest tube placed at bedside in ED with re-expansion of the left lung    Plan:  Maintain CT at -20 mmHg  Monitor air leak for readiness to wean     Pulmonary embolism (HCC)  Assessment & Plan  Patient has a history of previous pulmonary embolism in the setting of cosmetic surgery.  She was on coumadin for 6 months   She states that she has been significantly more sedentary over the last several months as she has been working from home.  She frequently is sitting for 8 hours at a time " "doing computer work and had not been doing her typical exercise routine   She developed worsening shortness of breath yesterday along with chest pain which prompted her evaluation   CT PE study completed in the ED was notable for \"Several segmental and subsegmental emboli in all lobes of the right lung. RV/LV diameter ratio at the upper limit of of normal at 1.0, borderline for right heart strain. Large left pneumothorax with extensive atelectasis of the left lung with no mediastinal shift.\"  No end organ damage  No troponin leak   Bnp normal     Plan:  Heparin gtt VTE protocol initiated  Will check echocardiogram   Transition to DOAC when CT removed  May need to consider life time anticoagulation given second PE   Patient states that she had a thrombosis workup with the previous PE that was negative     SIRS (systemic inflammatory response syndrome) (HCC)  Assessment & Plan  As evidenced by tachycardia and tachypnea, respiratory insufficiency   Likely secondary to pneumothorax  No leukocytosis, no fever, no organ dysfunction       Plan:   Will check lactate and procalcitonin   No indication for abx            History of Present Illness     HPI: Lazara Ivy is a 58 y.o. who presents with a past medical history of migraines and provoked PE s/p cosmetic surgery on 6 month coumadin course (2011), current use of ozempic for weight loss.  She presents to the emergency department for evaluation of progressive shortness of breath and chest pain.  She endorses 2-3 weeks of lingering upper respiratory symptoms for which she presented to urgent care 1 week ago.  She was given steroids, mucinex and cough suppressants.  She developed dyspnea with exertion yesterday after picking up her garbage cans that the wind blew over. She explains that she felt chest tightness that radiated up the left arm.  In the emergency department, CT imaging was notable for segmental and subsegmental PEs and left pneumothorax.  A left lateral chest " tube was placed in the ED.  She is referred to the step down unit for further management and care.       History obtained from chart review and the patient.  Review of Systems   Constitutional:  Positive for activity change and fatigue. Negative for chills.   HENT: Negative.     Eyes: Negative.    Respiratory:  Positive for cough, chest tightness and shortness of breath.    Cardiovascular:  Positive for chest pain.   Gastrointestinal: Negative.    Endocrine: Negative.    Genitourinary: Negative.    Musculoskeletal: Negative.    Allergic/Immunologic: Negative.    Neurological: Negative.    Hematological: Negative.    Psychiatric/Behavioral: Negative.       Disposition: Stepdown Level 1  Historical Information   No past medical history on file. Past Surgical History:  No date: BREAST IMPLANT  No date: BREAST IMPLANT  No date:  SECTION  No date: KNEE SURGERY  No date: TONSILLECTOMY   Current Outpatient Medications   Medication Instructions    glucosamine-chondroitin 500-400 MG tablet 1 tablet, Oral, 3 times daily    naproxen (NAPROSYN) 500 mg, Oral, 2 times daily with meals    No Known Allergies   Social History     Tobacco Use    Smoking status: Never   Vaping Use    Vaping status: Never Used   Substance Use Topics    Alcohol use: No    Drug use: No    History reviewed. No pertinent family history.       Objective                            Vitals I/O      Most Recent Min/Max in 24hrs   Temp 98 °F (36.7 °C) Temp  Min: 98 °F (36.7 °C)  Max: 98 °F (36.7 °C)   Pulse 103 Pulse  Min: 103  Max: 145   Resp (!) 23 Resp  Min: 17  Max: 34   /74 BP  Min: 123/74  Max: 197/109   O2 Sat 98 % SpO2  Min: 96 %  Max: 100 %    No intake or output data in the 24 hours ending 24 2311    Diet Regular; Regular House    Invasive Monitoring           Physical Exam   Physical Exam  Eyes:      General: Lids are normal.      Extraocular Movements: Extraocular movements intact.      Conjunctiva/sclera: Conjunctivae normal.    Skin:     General: Skin is warm and dry.   HENT:      Head: Normocephalic and atraumatic.   Neck:      Trachea: Trachea normal.   Cardiovascular:      Rate and Rhythm: Tachycardia present.      Pulses: Normal pulses.   Musculoskeletal:      Cervical back: Normal range of motion.      Right lower leg: No edema.      Left lower leg: No edema.   Abdominal: General: Abdomen is flat. Bowel sounds are normal.      Palpations: Abdomen is soft.      Tenderness: There is no abdominal tenderness.   Constitutional:       General: She is awake.      Appearance: She is well-developed and well-groomed.      Interventions: Nasal cannula in place.   Pulmonary:      Effort: Pulmonary effort is normal.      Breath sounds: Normal breath sounds.   Chest:      Comments: Left lateral chest tube intact to -20mmHg + air leak   Psychiatric:         Behavior: Behavior is cooperative.   Neurological:      General: No focal deficit present.      Mental Status: She is alert.      GCS: GCS eye subscore is 4. GCS verbal subscore is 5. GCS motor subscore is 6.      Sensory: Sensation is intact.            Diagnostic Studies      EKG: ST   Imaging:   XR chest 2 views   Final Result      Resolved left pneumothorax status post chest tube placement.            Workstation performed: [id number]         CTA ED chest PE study   Final Result      Several segmental and subsegmental emboli in all lobes of the right lung.      RV/LV diameter ratio at the upper limit of of normal at 1.0, borderline for right heart strain.      Large left pneumothorax with extensive atelectasis of the left lung with no mediastinal shift.      Small hiatal hernia.      Cholelithiasis.         I personally discussed this study with THANH MALONE on 3/12/2024 7:54 PM.                        Workstation performed: [id number]         XR chest 2 views   ED Interpretation   Left-sided pneumothorax.      XR chest 1 view portable    (Results Pending)          Medications:  Scheduled PRN   acetaminophen, 1,000 mg, Once  chlorhexidine, 15 mL, Q12H JOHANNY  lidocaine, 1 patch, Daily      ketorolac, 15 mg, Q6H PRN       Continuous    heparin (porcine), 3-30 Units/kg/hr (Order-Specific), Last Rate: 18 Units/kg/hr (03/12/24 2146)  multi-electrolyte, 50 mL/hr, Last Rate: 50 mL/hr (03/12/24 2255)         Labs:    CBC    Recent Labs     03/12/24  1716   WBC 6.87   HGB 16.3*   HCT 47.9*        BMP    Recent Labs     03/12/24  1716   SODIUM 139   K 3.7      CO2 24   AGAP 9   BUN 17   CREATININE 0.98   CALCIUM 9.6       Coags    Recent Labs     03/12/24  1716   INR 0.93   PTT 29        Additional Electrolytes  Recent Labs     03/12/24  1716   MG 2.2          Blood Gas    No recent results  No recent results LFTs  Recent Labs     03/12/24  1716   ALT 28   AST 13   ALKPHOS 73   ALB 4.6   TBILI 0.69       Infectious  Recent Labs     03/12/24  1716   PROCALCITONI <0.05     Glucose  Recent Labs     03/12/24  1716   GLUC 90             Anticipated Length of Stay is > 2 midnights  STEFANIE More

## 2024-03-13 NOTE — ASSESSMENT & PLAN NOTE
Recurrent, patient previously on warfarin for 6 months in 2011  ECHO normal   Was on hep gtt -- switched to eliquis   30 day supply sent to pharmacy (she will use coupon which covered the first 30 days and I have started the prior authorization for subsequent refills)

## 2024-03-13 NOTE — PROGRESS NOTES
Spiritual Care Progress Note    3/13/2024  Patient: Lazara Ivy : 1965  Admission Date & Time: 3/12/2024 1648  MRN: [id number] CSN: [id number]     visited patient per RN referral. Patient's daughter at bedside.  introduced self & role, explored support systems, normalized patient's experience, and provided prayer. Patient shared feelings of shock regarding medical changes, and gratitude for the support of medical team. Spiritual care remains available.             Chaplaincy Interventions Utilized:   Empowerment: Normalized experience of patient/family and Provided chaplaincy education    Exploration: Explored emotional needs & resources and Explored spiritual needs & resources    Collaboration: Consulted with interdisciplinary team    Relationship Building: Cultivated a relationship of care and support and Listened empathically    Ritual: Provided prayer      Chaplaincy Outcomes Achieved:  Distress reduced, Expressed gratitude, and Spiritual resources utilized      Spiritual Coping Strategies Utilized:   Spiritual comfort       24 1500   Clinical Encounter Type   Visited With Patient and family together   Routine Visit Introduction   Referral From Nurse   Mosque Encounters   Mosque Needs Prayer

## 2024-03-13 NOTE — ASSESSMENT & PLAN NOTE
"Patient has a history of previous pulmonary embolism in the setting of cosmetic surgery.  She was on coumadin for 6 months   She states that she has been significantly more sedentary over the last several months as she has been working from home.  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 RV/LV diameter ratio at the upper limit of of normal at 1.0, borderline for right heart strain. Large left pneumothorax with extensive atelectasis of the left lung with no mediastinal shift.\"  No end organ damage  No troponin leak   Bnp normal     Plan:  Heparin gtt VTE protocol initiated  Will check echocardiogram   Transition to DOAC when CT removed  May need to consider life time anticoagulation given second PE   Patient states that she had a thrombosis workup with the previous PE that was negative   "

## 2024-03-13 NOTE — UTILIZATION REVIEW
Initial Clinical Review    Admission: Date/Time/Statement:   Admission Orders (From admission, onward)       Ordered        24  INPATIENT ADMISSION  Once                          Orders Placed This Encounter   Procedures    INPATIENT ADMISSION     Standing Status:   Standing     Number of Occurrences:   1     Order Specific Question:   Level of Care     Answer:   Level 1 Stepdown [13]     Order Specific Question:   Bed request comments     Answer:   tele     Order Specific Question:   Estimated length of stay     Answer:   More than 2 Midnights     Order Specific Question:   Certification     Answer:   I certify that inpatient services are medically necessary for this patient for a duration of greater than two midnights. See H&P and MD Progress Notes for additional information about the patient's course of treatment.     ED Arrival Information       Expected   -    Arrival   3/12/2024 16:30    Acuity   Urgent              Means of arrival   Walk-In    Escorted by   Family Member    Service   Hospitalist    Admission type   Emergency              Arrival complaint   Chest Tightness,Arm Pain             Chief Complaint   Patient presents with    Shortness of Breath     Patient reports cough, congestion and phlegm for several weeks, seen at urgent care for same 1 week ago and provided prednisone but patient reports chest tightness and shortness of breath starting yesterday.        Initial Presentation: 58 y.o. female to the ED from home with complaints of shortness of breath, cough for about 2-3 weeks. Admitted to CRITICAL CARE step down for acute respiratory insufficiency, left pneumothorax.   H/O bilateral PE in  after breast implant surgery and tummy tuck, history of prior , currently on Ozempic for weight loss. Arrives with tachycardia. Has been feeling tightness in her chest. Decreased breath sounds.  D-dimer 2.29 CT chest shows: Several segmental and subsegmental emboli in all lobes of  the right lung. Large left pneumothorax. Chest tube placed in ED.  Started oN IV heparin drip.  Check ECHo.    Date: 3/13   Day 2:  Spontaneous pneumo likely due to coughing fits.  Continue with IV heparin drip. Repeat cxr shows Resolution of left pneumothorax after chest tube placement. . Has been worked up for genetic disorders in the past.  Lungs clear.     Pulmonary consult:  rge left-sided pneumothorax in the setting of recent tracheobronchitis with significant cough  Also with elevated D-dimer and CTA showing several segmental and subsegmental emboli in all lobes of the lung, RV/LV diameter at the upper limit of normal although echocardiogram shows normal RV size and systolic function. Stabel on room air.  Will need hypercoagulable workup. Continue IV heparin drip.   ED Triage Vitals   Temperature Pulse Respirations Blood Pressure SpO2   03/12/24 1636 03/12/24 1636 03/12/24 1636 03/12/24 1636 03/12/24 1636   98 °F (36.7 °C) (!) 107 18 138/91 98 %      Temp Source Heart Rate Source Patient Position - Orthostatic VS BP Location FiO2 (%)   03/13/24 0000 03/13/24 0000 03/13/24 0000 03/13/24 0000 --   Oral Monitor Lying Right arm       Pain Score       03/12/24 1636       5          Wt Readings from Last 1 Encounters:   03/13/24 70.8 kg (156 lb)     Additional Vital Signs: Vital Signs (last 2 days)    Date/Time Temp Pulse Resp BP MAP (mmHg) SpO2 Calculated FIO2 (%) - Nasal Cannula Nasal Cannula O2 Flow Rate (L/min) O2 Device Patient Position - Orthostatic VS   03/13/24 1055 97.7 °F (36.5 °C) 91 17 111/76 89 96 % -- -- None (Room air) Sitting   03/13/24 0855 -- 99 -- 142/84 -- -- -- -- -- --   03/13/24 0800 -- 99 18 142/84 105 96 % -- -- -- --   03/13/24 0754 98 °F (36.7 °C) 100 23 Abnormal  -- -- 95 % -- -- None (Room air) --   03/13/24 0700 -- 97 21 132/84 102 95 % -- -- -- Lying   03/13/24 0600 -- 92 19 125/76 94 93 % -- -- -- Lying   03/13/24 0400 -- 102 16 139/84 106 94 % -- -- -- Lying   03/13/24 0300 98 °F  (36.7 °C) -- -- -- -- -- -- -- None (Room air) --   03/13/24 0200 -- 112 Abnormal  23 Abnormal  149/80 105 95 % -- -- -- Lying   03/13/24 0100 98.2 °F (36.8 °C) 108 Abnormal  27 Abnormal  125/87 102 95 % -- -- -- --   03/13/24 0000 98.2 °F (36.8 °C) 110 Abnormal  26 Abnormal  127/79 99 95 % -- -- -- Lying   03/12/24 2315 -- 101 23 Abnormal  134/76 100 98 % -- -- -- --   03/12/24 2300 -- 103 23 Abnormal  123/74 94 98 % -- -- -- --   03/12/24 2245 -- 107 Abnormal  23 Abnormal  129/79 98 98 % 28 2 L/min Nasal cannula --   03/12/24 2230 -- 109 Abnormal  20 138/85 106 98 % 28 2 L/min -- --   03/12/24 2215 -- 105 22 132/71 95 97 % -- -- -- --   03/12/24 2200 -- 104 25 Abnormal  130/76 97 97 % -- -- -- --   03/12/24 2125 -- 104 24 Abnormal  138/89 109 97 % -- -- -- --   03/12/24 2124 -- 103 23 Abnormal  -- -- 97 % -- -- -- --   03/12/24 2057 -- 103 20 -- -- 99 % 28 2 L/min Nasal cannula --   03/12/24 2053 -- 108 Abnormal  20 140/75 102 100 % 28 2 L/min Nasal cannula --   03/12/24 2048 -- 107 Abnormal  20 140/84 -- 100 % 28 2 L/min Nasal cannula --   03/12/24 2045 -- 109 Abnormal  34 Abnormal  140/84 107 100 % -- -- -- --   03/12/24 2030 -- 136 Abnormal  21 -- -- 98 % 28 2 L/min Nasal cannula --   03/12/24 2029 -- 135 Abnormal  21 166/83 113 98 % 28 2 L/min Nasal cannula --   03/12/24 2028 -- 137 Abnormal  22 181/85 Abnormal  122 98 % -- -- -- --   03/12/24 2027 -- 138 Abnormal  22 -- -- 97 % 28 2 L/min Nasal cannula --   03/12/24 2025 -- 145 Abnormal  20 186/91 Abnormal  -- 97 % 28 2 L/min Nasal cannula --   03/12/24 2024 -- 143 Abnormal  21 186/91 Abnormal  131 97 % 28 2 L/min Nasal cannula --   03/12/24 2022 -- 137 Abnormal  20 174/91 Abnormal  -- 97 % 28 2 L/min Nasal cannula --   03/12/24 2020 -- 139 Abnormal  24 Abnormal  174/91 Abnormal  123 97 % -- -- -- --   03/12/24 20:18:29 -- 140 Abnormal  20 192/91 Abnormal  -- 98 % 28 2 L/min Nasal cannula --   03/12/24 2018 -- 139 Abnormal  31 Abnormal  192/91 Abnormal  131  97 % -- -- -- --   03/12/24 20:16:06 -- 138 Abnormal  20 -- -- 97 % 28 2 L/min Nasal cannula --   03/12/24 2015 -- 140 Abnormal  23 Abnormal  -- -- 96 % -- -- -- --   03/12/24 2014 -- 135 Abnormal  18 197/109 Abnormal  145 99 % 28 2 L/min Nasal cannula --   03/12/24 2010 -- 122 Abnormal  20 155/94 115 98 % -- -- -- --   03/12/24 2006 -- 123 Abnormal  20 158/85 -- 98 % 28 2 L/min Nasal cannula --   03/12/24 2000 -- 118 Abnormal  18 156/87 -- 98 % 28 2 L/min Nasal cannula --   03/12/24 19:57:28 -- -- -- -- -- -- -- -- Nasal cannula  --   O2 Device: pt O2 97 % RA, Pt. placed on 2lnc oxygen prior to beginning procedure at 03/12/24 1957 03/12/24 1957 -- 121 Abnormal  21 148/93 116 98 % 28 2 L/min Nasal cannula --   03/12/24 1945 -- 116 Abnormal  23 Abnormal  156/91 118 97 % 28 2 L/min -- --   03/12/24 1900 -- 108 Abnormal  17 -- -- 97 % -- -- -- --   03/12/24 1845 -- 109 Abnormal  -- 183/113 Abnormal  138 98 % -- -- -- --   03/12/24 1636 98 °F (36.7 °C) 107 Abnormal  18 138/91 -- 98 % -- -- -- --     Pertinent Labs/Diagnostic Test Results:   XR chest 2 views   Final Result by Steven Hansen MD (03/12 2122)      Resolved left pneumothorax status post chest tube placement.            Workstation performed: [id number]         XR chest 1 view portable   Final Result by Noe Lowe MD (03/13 1238)      Resolution of left pneumothorax after chest tube placement. Some left basilar atelectasis is noted            Workstation performed: [id number]         CTA ED chest PE study   Final Result by Tika Rincon MD (03/12 1955)      Several segmental and subsegmental emboli in all lobes of the right lung.      RV/LV diameter ratio at the upper limit of of normal at 1.0, borderline for right heart strain.      Large left pneumothorax with extensive atelectasis of the left lung with no mediastinal shift.      Small hiatal hernia.      Cholelithiasis.         I personally discussed this study with  THANH GOLDMAN on 3/12/2024 7:54 PM.                        Workstation performed: [id number]         XR chest 2 views   ED Interpretation by Thanh Goldman DO (03/12 1823)   Left-sided pneumothorax.      Final Result by Kate Salazar MD (03/13 1041)      Large left pneumothorax without obvious mediastinal shift.   At the time of the interpretation, the pneumothorax was already reported on patient's CT, and chest tube was inserted.            Workstation performed: [id number]          VAS VENOUS DUPLEX - LOWER LIMB BILATERAL     CONCLUSION:  RIGHT LOWER LIMB:  There is evidence of acute occlusive deep vein thrombosis noted in one of the  posterior tibial and paired peroneal veins.  No evidence of superficial thrombophlebitis noted.  Doppler evaluation shows a normal response to augmentation maneuvers..  Popliteal, posterior tibial and anterior tibial arterial Doppler waveform's are  triphasic.     LEFT LOWER LIMB:  No evidence of acute or chronic deep vein thrombosis.  No evidence of superficial thrombophlebitis noted.  Doppler evaluation shows a normal response to augmentation maneuvers.  Popliteal, posterior tibial and anterior tibial arterial Doppler waveform's are  triphasic.     Results from last 7 days   Lab Units 03/12/24  1716   SARS-COV-2  Negative     Results from last 7 days   Lab Units 03/13/24  0424 03/12/24  1716   WBC Thousand/uL 9.72 6.87   HEMOGLOBIN g/dL 14.3 16.3*   HEMATOCRIT % 43.1 47.9*   PLATELETS Thousands/uL 274 326   NEUTROS ABS Thousands/µL 6.26 2.97         Results from last 7 days   Lab Units 03/13/24  0424 03/12/24  1716   SODIUM mmol/L 140 139   POTASSIUM mmol/L 3.8 3.7   CHLORIDE mmol/L 112* 106   CO2 mmol/L 21 24   ANION GAP mmol/L 7 9   BUN mg/dL 9 17   CREATININE mg/dL 0.64 0.98   EGFR ml/min/1.73sq m 100 64   CALCIUM mg/dL 8.6 9.6   CALCIUM, IONIZED mmol/L 1.14  --    MAGNESIUM mg/dL 2.1 2.2   PHOSPHORUS mg/dL 2.8  --      Results from last 7 days   Lab Units  03/12/24  1716   AST U/L 13   ALT U/L 28   ALK PHOS U/L 73   TOTAL PROTEIN g/dL 7.7   ALBUMIN g/dL 4.6   TOTAL BILIRUBIN mg/dL 0.69   BILIRUBIN DIRECT mg/dL 0.08         Results from last 7 days   Lab Units 03/13/24  0424 03/12/24  1716   GLUCOSE RANDOM mg/dL 99 90         Results from last 7 days   Lab Units 03/13/24  0424   HEMOGLOBIN A1C % 5.7*   EAG mg/dl 117       Results from last 7 days   Lab Units 03/12/24  1913 03/12/24  1716   HS TNI 0HR ng/L  --  <2   HS TNI 2HR ng/L <2  --      Results from last 7 days   Lab Units 03/12/24  1716   D-DIMER QUANTITATIVE ug/ml FEU 2.29*     Results from last 7 days   Lab Units 03/13/24  0435 03/13/24  0424 03/12/24  1716   PROTIME seconds 14.4  --  13.0   INR  1.05  --  0.93   PTT seconds  --  119* 29       Results from last 7 days   Lab Units 03/13/24  0424 03/12/24  1716   PROCALCITONIN ng/ml <0.05 <0.05     Results from last 7 days   Lab Units 03/13/24  0054   LACTIC ACID mmol/L 1.1           Results from last 7 days   Lab Units 03/12/24  1716   BNP pg/mL 6     Results from last 7 days   Lab Units 03/12/24  1716   INFLUENZA A PCR  Negative   INFLUENZA B PCR  Negative   RSV PCR  Negative     ED Treatment:   Medication Administration from 03/12/2024 1630 to 03/12/2024 2334         Date/Time Order Dose Route Action Action by Comments     03/12/2024 1724 EDT sodium chloride 0.9 % bolus 500 mL 500 mL Intravenous New Bag Awais Zee RN --     03/12/2024 1713 EDT aspirin chewable tablet 324 mg 324 mg Oral Given Awais Zee RN --     03/12/2024 1714 EDT ipratropium (ATROVENT) 0.02 % inhalation solution 0.5 mg 0.5 mg Nebulization Given Awais Zee RN --     03/12/2024 1714 EDT albuterol inhalation solution 5 mg 5 mg Nebulization Given Awais Zee RN --     03/12/2024 2013 EDT Ketamine HCl 150 mg 70 mg Intravenous Given Marie Lim RN 70 mg given now to start per verbal order @ 20:13    pt given 30 mg via verbal order @ 20:20. total of 70 mg given at  this time.     03/12/2024 1934 EDT ondansetron (ZOFRAN) injection 4 mg 4 mg Intravenous Given Carmen Schneider RN --     03/12/2024 2048 EDT fentaNYL injection 50 mcg 50 mcg Intravenous Given Marie Lim RN --     03/12/2024 2141 EDT heparin (porcine) injection 5,600 Units 5,600 Units Intravenous Given Marie Lim RN --     03/12/2024 2146 EDT heparin (porcine) 25,000 units in 0.45% NaCl 250 mL infusion (premix) 18 Units/kg/hr Intravenous New Bag Marie Lim RN --     03/12/2024 2255 EDT multi-electrolyte (PLASMALYTE-A/ISOLYTE-S PH 7.4) IV solution 50 mL/hr Intravenous New Bag Awais Zee RN --            Admitting Diagnosis: Spontaneous pneumothorax [J93.83]  Chest pain [R07.9]  SOB (shortness of breath) [R06.02]  Exertional dyspnea [R06.09]  Viral URI with cough [J06.9]  Pulmonary emboli (HCC) [I26.99]  Age/Sex: 58 y.o. female  Admission Orders:  Scheduled Medications:  lidocaine, 1 patch, Topical, Daily  potassium-sodium phosphateS, 2 tablet, Oral, 4x Daily (with meals and at bedtime)      Continuous IV Infusions:  heparin (porcine), 3-30 Units/kg/hr (Order-Specific), Intravenous, Titrated      PRN Meds:  ketorolac, 15 mg, Intravenous, Q6H PRN        IP CONSULT TO CASE MANAGEMENT  IP CONSULT TO PULMONOLOGY    Network Utilization Review Department  ATTENTION: Please call with any questions or concerns to [phone] and carefully listen to the prompts so that you are directed to the right person. All voicemails are confidential.   For Discharge needs, contact Care Management DC Support Team at [phone] opt. 2  Send all requests for admission clinical reviews, approved or denied determinations and any other requests to dedicated fax number below belonging to the campus where the patient is receiving treatment. List of dedicated fax numbers for the Facilities:  FACILITY NAME UR FAX NUMBER   ADMISSION DENIALS (Administrative/Medical Necessity) [phone]   DISCHARGE SUPPORT TEAM (NETWORK)  [phone]   PARENT CHILD HEALTH (Maternity/NICU/Pediatrics) [phone]   [facility] [phone]   [facility] [phone]   [facility] [phone]   [facility] [phone]   [facility] [phone]   [facility] [phone]   [facility] [phone]   [facility] [phone]   [facility] [phone]   [facility] [phone]   [facility] [phone]   [facility] [phone]

## 2024-03-13 NOTE — ASSESSMENT & PLAN NOTE
"Multifactorial in the setting of acute left sided pneumothorax and segmental/subsegmental PE  Symptoms likely predominantly secondary to pneumothorax  CTA notable for \"Several segmental and subsegmental emboli in all lobes of the right lung.  RV/LV diameter ratio at the upper limit of of normal at 1.0, borderline for right heart strain. Large left pneumothorax with extensive atelectasis of the left lung with no mediastinal shift.\"  Left CT placed in ED with re-expansion of the lung  No evidence of end organ damage, negative troponin, normal BNP    Plan:  O2 support with traditional nasal cannula   Maintain left lateral chest tube at -20 mmhg  Monitor air leak for readiness to wean to water seal   Pain management with lidocaine patch, PRN tylenol and PRN toradol   Continue heparin gtt VTE   Check echocardiogram   Need to consider lifetime anticoagulation as this is her second PE in her life time.  First was provoked in the setting of cosmetic surgery   Patient states that she previously had thrombosis testing that was negative   "
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
## 2024-03-14 ENCOUNTER — APPOINTMENT (INPATIENT)
Dept: RADIOLOGY | Facility: HOSPITAL | Age: 59
DRG: 199 | End: 2024-03-14
Payer: COMMERCIAL

## 2024-03-14 VITALS
OXYGEN SATURATION: 95 % | DIASTOLIC BLOOD PRESSURE: 83 MMHG | BODY MASS INDEX: 30.63 KG/M2 | HEART RATE: 79 BPM | HEIGHT: 60 IN | TEMPERATURE: 98.3 F | SYSTOLIC BLOOD PRESSURE: 126 MMHG | RESPIRATION RATE: 18 BRPM | WEIGHT: 156 LBS

## 2024-03-14 PROBLEM — O22.30 DVT (DEEP VEIN THROMBOSIS) IN PREGNANCY: Status: ACTIVE | Noted: 2024-03-14

## 2024-03-14 LAB
ANION GAP SERPL CALCULATED.3IONS-SCNC: 7 MMOL/L (ref 4–13)
APTT PPP: 74 SECONDS (ref 23–37)
BUN SERPL-MCNC: 11 MG/DL (ref 5–25)
CALCIUM SERPL-MCNC: 8.7 MG/DL (ref 8.4–10.2)
CHLORIDE SERPL-SCNC: 109 MMOL/L (ref 96–108)
CO2 SERPL-SCNC: 24 MMOL/L (ref 21–32)
CREAT SERPL-MCNC: 0.7 MG/DL (ref 0.6–1.3)
ERYTHROCYTE [DISTWIDTH] IN BLOOD BY AUTOMATED COUNT: 14.7 % (ref 11.6–15.1)
GFR SERPL CREATININE-BSD FRML MDRD: 97 ML/MIN/1.73SQ M
GLUCOSE SERPL-MCNC: 114 MG/DL (ref 65–140)
HCT VFR BLD AUTO: 39.4 % (ref 34.8–46.1)
HGB BLD-MCNC: 13.3 G/DL (ref 11.5–15.4)
MAGNESIUM SERPL-MCNC: 2.1 MG/DL (ref 1.9–2.7)
MCH RBC QN AUTO: 31.1 PG (ref 26.8–34.3)
MCHC RBC AUTO-ENTMCNC: 33.8 G/DL (ref 31.4–37.4)
MCV RBC AUTO: 92 FL (ref 82–98)
PHOSPHATE SERPL-MCNC: 4.1 MG/DL (ref 2.7–4.5)
PLATELET # BLD AUTO: 260 THOUSANDS/UL (ref 149–390)
PMV BLD AUTO: 8.9 FL (ref 8.9–12.7)
POTASSIUM SERPL-SCNC: 3.7 MMOL/L (ref 3.5–5.3)
RBC # BLD AUTO: 4.28 MILLION/UL (ref 3.81–5.12)
SODIUM SERPL-SCNC: 140 MMOL/L (ref 135–147)
WBC # BLD AUTO: 5.84 THOUSAND/UL (ref 4.31–10.16)

## 2024-03-14 PROCEDURE — 83735 ASSAY OF MAGNESIUM: CPT | Performed by: NURSE PRACTITIONER

## 2024-03-14 PROCEDURE — 84100 ASSAY OF PHOSPHORUS: CPT | Performed by: NURSE PRACTITIONER

## 2024-03-14 PROCEDURE — 99239 HOSP IP/OBS DSCHRG MGMT >30: CPT | Performed by: STUDENT IN AN ORGANIZED HEALTH CARE EDUCATION/TRAINING PROGRAM

## 2024-03-14 PROCEDURE — 85027 COMPLETE CBC AUTOMATED: CPT | Performed by: NURSE PRACTITIONER

## 2024-03-14 PROCEDURE — 85730 THROMBOPLASTIN TIME PARTIAL: CPT | Performed by: INTERNAL MEDICINE

## 2024-03-14 PROCEDURE — 71045 X-RAY EXAM CHEST 1 VIEW: CPT

## 2024-03-14 PROCEDURE — 80048 BASIC METABOLIC PNL TOTAL CA: CPT | Performed by: NURSE PRACTITIONER

## 2024-03-14 RX ORDER — LIDOCAINE 50 MG/G
1 PATCH TOPICAL DAILY
Qty: 30 PATCH | Refills: 0 | Status: SHIPPED | OUTPATIENT
Start: 2024-03-15

## 2024-03-14 RX ADMIN — LIDOCAINE 5% 1 PATCH: 700 PATCH TOPICAL at 09:31

## 2024-03-14 RX ADMIN — APIXABAN 10 MG: 5 TABLET, FILM COATED ORAL at 10:41

## 2024-03-14 NOTE — DISCHARGE INSTR - AVS FIRST PAGE
- Please continue eliquis 10 mg twice daily x 7 days, then switch to eliquis 5 mg daily indefinitely unless otherwise directed by pulmonology   - Please follow with PCP within 1-2 weeks

## 2024-03-14 NOTE — PROGRESS NOTES
Progress Note - Pulmonary   Lazara Ivy 58 y.o. female MRN: [id number]  Unit/Bed#: -01 Encounter: [id number]    Assessment:  Left sided pneumothorax  Acute pulmonary embolism  Acute DVT  History of DVT in 2011    Plan:  Large left-sided pneumothorax in the setting of recent tracheobronchitis with significant cough  Also with elevated D-dimer and CTA with several segmental and subsegmental emboli, right lower extremity DVT  Chest tube was clamped yesterday, repeat chest x-ray this morning shows continued expansion of the lung.  Chest tube was removed without difficulty  She will be transitioned to DOAC today  Would need 6 months of anticoagulation, would repeat hypercoagulable workup given this is her second clot  Provoking factors for the PE and DVT could be pulmonary place and earlier this year, Ozempic also seems to increase the risk for DVT  She is stable for discharge home today, can follow-up with us in the office in 1 to 2 weeks    Subjective:   Patient resting in bed. She is feeling better this morning.     Objective:     Vitals: Blood pressure 126/83, pulse 79, temperature 98.3 °F (36.8 °C), resp. rate 18, height 5' (1.524 m), weight 70.8 kg (156 lb), last menstrual period 07/26/2017, SpO2 95%.,Body mass index is 30.47 kg/m².    No intake or output data in the 24 hours ending 03/14/24 1036    Invasive Devices       Peripheral Intravenous Line  Duration             Peripheral IV 03/12/24 Left;Proximal;Ventral (anterior) Forearm 1 day    Peripheral IV 03/12/24 Right Antecubital 1 day              Drain  Duration             Chest Tube Left 1 day                    Physical Exam: /83   Pulse 79   Temp 98.3 °F (36.8 °C)   Resp 18   Ht 5' (1.524 m)   Wt 70.8 kg (156 lb)   LMP 07/26/2017   SpO2 95%   BMI 30.47 kg/m²   General appearance: alert and oriented, in no acute distress  Head: Normocephalic, without obvious abnormality, atraumatic  Eyes: negative findings: conjunctivae and sclerae  normal  Lungs: clear to auscultation bilaterally  Heart: regular rate and rhythm  Abdomen: normal findings: soft, non-tender  Extremities:  no edema  Skin:  warm and dry  Neurologic: Mental status: Alert, oriented, thought content appropriate     Labs: I have personally reviewed pertinent lab results., CBC:   Lab Results   Component Value Date    WBC 5.84 03/14/2024    HGB 13.3 03/14/2024    HCT 39.4 03/14/2024    MCV 92 03/14/2024     03/14/2024    RBC 4.28 03/14/2024    MCH 31.1 03/14/2024    MCHC 33.8 03/14/2024    RDW 14.7 03/14/2024    MPV 8.9 03/14/2024   , CMP:   Lab Results   Component Value Date    SODIUM 140 03/14/2024    K 3.7 03/14/2024     (H) 03/14/2024    CO2 24 03/14/2024    BUN 11 03/14/2024    CREATININE 0.70 03/14/2024    CALCIUM 8.7 03/14/2024    EGFR 97 03/14/2024     Imaging and other studies: I have personally reviewed pertinent reports.   and I have personally reviewed pertinent films in PACS

## 2024-03-14 NOTE — PLAN OF CARE
Problem: PAIN - ADULT  Goal: Verbalizes/displays adequate comfort level or baseline comfort level  Description: Interventions:  - Encourage patient to monitor pain and request assistance  - Assess pain using appropriate pain scale  - Administer analgesics based on type and severity of pain and evaluate response  - Implement non-pharmacological measures as appropriate and evaluate response  - Consider cultural and social influences on pain and pain management  - Notify physician/advanced practitioner if interventions unsuccessful or patient reports new pain  Outcome: Progressing     Problem: INFECTION - ADULT  Goal: Absence or prevention of progression during hospitalization  Description: INTERVENTIONS:  - Assess and monitor for signs and symptoms of infection  - Monitor lab/diagnostic results  - Monitor all insertion sites, i.e. indwelling lines, tubes, and drains  - Monitor endotracheal if appropriate and nasal secretions for changes in amount and color  - [place] appropriate cooling/warming therapies per order  - Administer medications as ordered  - Instruct and encourage patient and family to use good hand hygiene technique  - Identify and instruct in appropriate isolation precautions for identified infection/condition  Outcome: Progressing  Goal: Absence of fever/infection during neutropenic period  Description: INTERVENTIONS:  - Monitor WBC    Outcome: Progressing     Problem: SAFETY ADULT  Goal: Patient will remain free of falls  Description: INTERVENTIONS:  - Educate patient/family on patient safety including physical limitations  - Instruct patient to call for assistance with activity   - Consult OT/PT to assist with strengthening/mobility   - Keep Call bell within reach  - Keep bed low and locked with side rails adjusted as appropriate  - Keep care items and personal belongings within reach  - Initiate and maintain comfort rounds  - Make Fall Risk Sign visible to staff  - Offer Toileting every 2 Hours,  in advance of need  - Initiate/Maintain bed alarm  - Obtain necessary fall risk management equipment: yes   - Apply yellow socks and bracelet for high fall risk patients  - Consider moving patient to room near nurses station  Outcome: Progressing     Problem: DISCHARGE PLANNING  Goal: Discharge to home or other facility with appropriate resources  Description: INTERVENTIONS:  - Identify barriers to discharge w/patient and caregiver  - Arrange for needed discharge resources and transportation as appropriate  - Identify discharge learning needs (meds, wound care, etc.)  - Arrange for interpretive services to assist at discharge as needed  - Refer to Case Management Department for coordinating discharge planning if the patient needs post-hospital services based on physician/advanced practitioner order or complex needs related to functional status, cognitive ability, or social support system  Outcome: Progressing     Problem: Knowledge Deficit  Goal: Patient/family/caregiver demonstrates understanding of disease process, treatment plan, medications, and discharge instructions  Description: Complete learning assessment and assess knowledge base.  Interventions:  - Provide teaching at level of understanding  - Provide teaching via preferred learning methods  Outcome: Progressing     Problem: CARDIOVASCULAR - ADULT  Goal: Maintains optimal cardiac output and hemodynamic stability  Description: INTERVENTIONS:  - Monitor I/O, vital signs and rhythm  - Monitor for S/S and trends of decreased cardiac output  - Administer and titrate ordered vasoactive medications to optimize hemodynamic stability  - Assess quality of pulses, skin color and temperature  - Assess for signs of decreased coronary artery perfusion  - Instruct patient to report change in severity of symptoms  Outcome: Progressing  Goal: Absence of cardiac dysrhythmias or at baseline rhythm  Description: INTERVENTIONS:  - Continuous cardiac monitoring, vital signs,  obtain 12 lead EKG if ordered  - Administer antiarrhythmic and heart rate control medications as ordered  - Monitor electrolytes and administer replacement therapy as ordered  Outcome: Progressing     Problem: RESPIRATORY - ADULT  Goal: Achieves optimal ventilation and oxygenation  Description: INTERVENTIONS:  - Assess for changes in respiratory status  - Assess for changes in mentation and behavior  - Position to facilitate oxygenation and minimize respiratory effort  - Oxygen administered by appropriate delivery if ordered  - Initiate smoking cessation education as indicated  - Encourage broncho-pulmonary hygiene including cough, deep breathe, Incentive Spirometry  - Assess the need for suctioning and aspirate as needed  - Assess and instruct to report SOB or any respiratory difficulty  - Respiratory Therapy support as indicated  Outcome: Progressing

## 2024-03-14 NOTE — DISCHARGE SUMMARY
[facility]  Discharge- Lazara Ivy 1965, 58 y.o. female MRN: [id number]  Unit/Bed#: -[facility] Encounter: [id number]  Primary Care Provider: Anish Cuevas MD   Date and time admitted to hospital: 3/12/2024  4:48 PM    DVT (deep vein thrombosis) in pregnancy  Assessment & Plan  RLE doppler showed - There is evidence of acute occlusive deep vein thrombosis noted in one of the  posterior tibial and paired peroneal veins.    Pulmonary embolism (HCC)  Assessment & Plan  Recurrent, patient previously on warfarin for 6 months in 2011  ECHO normal   Was on hep gtt -- switched to eliquis   30 day supply sent to pharmacy (she will use coupon which covered the first 30 days and I have started the prior authorization for subsequent refills)     SIRS (systemic inflammatory response syndrome) (HCC)  Assessment & Plan  Likely related to acuity of presentation  Observe off antibiotics    * Pneumothorax, left  Assessment & Plan  Uncertain etiology- question increased coughing  Chest tube to water seal on 3/13  Chest tube removed this AM by pulmonology   CXR - no ptx, clear lungs         Medical Problems       Resolved Problems  Date Reviewed: 7/31/2017            Resolved    Insufficiency, respiratory, acute 3/13/2024     Resolved by  STEFANIE Daigle        Discharging Physician / Practitioner: Benson Richardson MD  PCP: Anish Cuevas MD  Admission Date:   Admission Orders (From admission, onward)       Ordered        03/12/24 2104  INPATIENT ADMISSION  Once                          Discharge Date: 03/14/24    Consultations During Hospital Stay:  Pulmonology     Procedures Performed:   Chest tube placement and removal     Significant Findings / Test Results:   CT chest showed several segmental and subsegmental emboli in all lobes of the right lung.  Large left pneumothorax with extensive atelectasis of the left lung with no mediastinal shift.    Incidental Findings:   PE      Test Results  Pending at Discharge (will require follow up):   none     Outpatient Tests Requested:  none    Complications:  none    Reason for Admission: PE, ptx, dvt    Hospital Course:   Lazara Ivy is a 58 y.o. female patient who originally presented to the hospital on 3/12/2024 due to dyspnea with exertion.  And chest tightness radiating up to her left arm.  She was found to have pulmonary embolism as well as left-sided pneumothorax.  Left lateral chest tube was placed in the ED and she was admitted to ICU for further management.  She was also found to have right lower extremity DVT and was initially started on heparin drip and transition to Eliquis.  Eliquis was prescribed to be continued on discharge and prior authorization was started for this medication.        Please see above list of diagnoses and related plan for additional information.     Condition at Discharge: good    Discharge Day Visit / Exam:   Subjective:  Feels well with no pain or SOB.   Vitals: Blood Pressure: 126/83 (03/14/24 0724)  Pulse: 79 (03/14/24 0724)  Temperature: 98.3 °F (36.8 °C) (03/14/24 0724)  Temp Source: Oral (03/13/24 2243)  Respirations: 18 (03/14/24 0724)  Height: 5' (152.4 cm) (03/13/24 0855)  Weight - Scale: 70.8 kg (156 lb) (03/13/24 0855)  SpO2: 95 % (03/14/24 0724)  Exam:   Physical Exam   NAD  Nonlabored resp  RRR  NTND abd  aaox3    Discussion with Family: Updated  (sister) at bedside.    Discharge instructions/Information to patient and family:   See after visit summary for information provided to patient and family.      Provisions for Follow-Up Care:  See after visit summary for information related to follow-up care and any pertinent home health orders.      Mobility at time of Discharge:   Basic Mobility Inpatient Raw Score: 24  JH-HLM Goal: 8: Walk 250 feet or more  JH-HLM Achieved: 8: Walk 250 feet ot more  HLM Goal achieved. Continue to encourage appropriate mobility.     Disposition:   Home    Planned  Readmission: none     Discharge Statement:  I spent 55 minutes discharging the patient. This time was spent on the day of discharge. I had direct contact with the patient on the day of discharge. Greater than 50% of the total time was spent examining patient, answering all patient questions, arranging and discussing plan of care with patient as well as directly providing post-discharge instructions.  Additional time then spent on discharge activities.    Discharge Medications:  See after visit summary for reconciled discharge medications provided to patient and/or family.      **Please Note: This note may have been constructed using a voice recognition system**

## 2024-03-14 NOTE — ASSESSMENT & PLAN NOTE
RLE doppler showed - There is evidence of acute occlusive deep vein thrombosis noted in one of the  posterior tibial and paired peroneal veins.

## 2024-03-14 NOTE — CASE MANAGEMENT
Case Management Assessment & Discharge Planning Note    Patient name Lazara Ivy  Location /-01 MRN [id number]  : 1965 Date 3/14/2024       Current Admission Date: 3/12/2024  Current Admission Diagnosis:Pneumothorax, left   Patient Active Problem List    Diagnosis Date Noted    Pneumothorax, left 2024    SIRS (systemic inflammatory response syndrome) (HCC) 2024    Pulmonary embolism (HCC) 2024      LOS (days): 2  Geometric Mean LOS (GMLOS) (days):   Days to GMLOS:     OBJECTIVE:    Risk of Unplanned Readmission Score: 5.77         Current admission status: Inpatient       Preferred Pharmacy:   [id number] - [place], [place] - [address] [address]  Phone: [phone] Fax: [phone]    Primary Care Provider: Anish Cuevas MD    Primary Insurance: BLUE CROSS  Secondary Insurance:     ASSESSMENT:  Active Health Care Proxies    There are no active Health Care Proxies on file.       Advance Directives  Does patient have a Health Care POA?: No  Was patient offered paperwork?: Yes (POA paperwork provided)  Does patient currently have a Health Care decision maker?: Yes, please see Health Care Proxy section  Does patient have Advance Directives?: No  Was patient offered paperwork?: Yes (POA paperwork provided)  Primary Contact: Kedar Arreguin [employer]  30 Day Readmission: No    Patient Information  Admitted from:: Home  Mental Status: Alert  During Assessment patient was accompanied by: Other-Comment (patient had 2 people in the room .)  Assessment information provided by:: Patient  Primary Caregiver: Self  Support Systems: Daughter, Spouse/significant other  County of Residence: [place]  What city do you live in?: [place]  Home entry access options. Select all that apply.: Stairs  Number of steps to enter home.: 4  Type of Current Residence: [facility]  Living Arrangements: Lives w/ Spouse/significant other, Lives w/  Daughter  Is patient a ?: No    Activities of Daily Living Prior to Admission  Functional Status: Independent  Completes ADLs independently?: Yes  Ambulates independently?: Yes  Does patient use assisted devices?: No  Does patient currently own DME?: Yes  What DME does the patient currently own?: Walker, Straight Cane, Shower Chair  Does patient have a history of Outpatient Therapy (PT/OT)?: Yes  Does the patient have a history of Short-Term Rehab?: No  Does patient have a history of HHC?: Yes  Does patient currently have HHC?: No         Patient Information Continued  Income Source: Employed  Does patient have prescription coverage?: Yes  Does patient receive dialysis treatments?: No  Does patient have a history of substance abuse?: No  Does patient have a history of Mental Health Diagnosis?: No    PHQ 2/9 Screening   Reviewed PHQ 2/9 Depression Screening Score?: No    Means of Transportation  Means of Transport to Appts:: Drives Self      Social Determinants of Health (SDOH)      Flowsheet Row Most Recent Value   Housing Stability    In the last 12 months, was there a time when you were not able to pay the mortgage or rent on time? N   In the last 12 months, how many places have you lived? 1   In the last 12 months, was there a time when you did not have a steady place to sleep or slept in a shelter (including now)? N   Transportation Needs    In the past 12 months, has lack of transportation kept you from medical appointments or from getting medications? no   In the past 12 months, has lack of transportation kept you from meetings, work, or from getting things needed for daily living? No   Food Insecurity    Within the past 12 months, you worried that your food would run out before you got the money to buy more. Never true   Within the past 12 months, the food you bought just didn't last and you didn't have money to get more. Never true   Utilities    In the past 12 months has the electric, gas, oil, or  water company threatened to shut off services in your home? No            DISCHARGE DETAILS:    Discharge planning discussed with:: Patient in room  Freedom of Choice: Yes  Comments - Freedom of Choice: CM discussed freedom of choice as it pertains to discharge planning. Patient declined having any needs.  CM contacted family/caregiver?: No- see comments  Were Treatment Team discharge recommendations reviewed with patient/caregiver?: Yes  Did patient/caregiver verbalize understanding of patient care needs?: Yes  Were patient/caregiver advised of the risks associated with not following Treatment Team discharge recommendations?: Yes         Requested Home Health Care         Is the patient interested in HHC at discharge?: No    DME Referral Provided  Referral made for DME?: No    Other Referral/Resources/Interventions Provided:  Interventions: Declines resources, None Indicated    Would you like to participate in our [facility] service program?  : No - Declined    Treatment Team Recommendation: Home  Discharge Destination Plan:: Home  Transport at Discharge : Family

## 2024-03-14 NOTE — CASE MANAGEMENT
Case Management Discharge Planning Note    Patient name Lazara Ivy  Location /-01 MRN [id number]  : 1965 Date 3/14/2024       Current Admission Date: 3/12/2024  Current Admission Diagnosis:Pneumothorax, left   Patient Active Problem List    Diagnosis Date Noted    Pneumothorax, left 2024    SIRS (systemic inflammatory response syndrome) (HCC) 2024    Pulmonary embolism (HCC) 2024      LOS (days): 2  Geometric Mean LOS (GMLOS) (days):   Days to GMLOS:     OBJECTIVE:  Risk of Unplanned Readmission Score: 5.79         Current admission status: Inpatient   Preferred Pharmacy:   [id number] - [place] - [address]  [address] [facility] [address]  Phone: [phone] Fax: [phone]    [id number]  [place] - [address] [facility]  [address] [place] [address]  Phone: [phone] Fax: [phone]    Primary Care Provider: Anish Cuevas MD    Primary Insurance: BLUE CROSS  Secondary Insurance:     DISCHARGE DETAILS:     CM called patients pharmacy and was informed pt is eligible for eliquis coupon. CM provided pt with coupon.     [id number] - [place] - [address]  [address] [place] [address]  Phone: [phone]  Fax: [phone]

## 2024-03-20 NOTE — UTILIZATION REVIEW
NOTIFICATION OF ADMISSION DISCHARGE   This is a Notification of Discharge from [facility]. Please be advised that this patient has been discharge from our facility. Below you will find the admission and discharge date and time including the patient’s disposition.   UTILIZATION REVIEW CONTACT:  Veronica Bella  Utilization   Network Utilization Review Department  Phone: [phone] x carefully listen to the prompts. All voicemails are confidential.  Email: [email or web address]     ADMISSION INFORMATION  PRESENTATION DATE: 3/12/2024  4:48 PM  OBERVATION ADMISSION DATE:   INPATIENT ADMISSION DATE: 3/12/24  9:04 PM   DISCHARGE DATE: 3/14/2024  3:12 PM   DISPOSITION:Home/Self Care    Network Utilization Review Department  ATTENTION: Please call with any questions or concerns to [phone] and carefully listen to the prompts so that you are directed to the right person. All voicemails are confidential.   For Discharge needs, contact Care Management DC Support Team at [phone] opt. 2  Send all requests for admission clinical reviews, approved or denied determinations and any other requests to dedicated fax number below belonging to the campus where the patient is receiving treatment. List of dedicated fax numbers for the Facilities:  FACILITY NAME UR FAX NUMBER   ADMISSION DENIALS (Administrative/Medical Necessity) [phone]   DISCHARGE SUPPORT TEAM ([facility]) [phone]   PARENT CHILD HEALTH (Maternity/NICU/Pediatrics) [phone]   [facility] [phone]   [facility] [phone]   [facility] [phone]   [facility] [phone]   [facility] [phone]   [facility] [phone]   [facility] [phone]   [facility]  [phone]   [facility] [phone]   [facility] [phone]   [facility] [phone]   [facility] [phone]

## 2024-03-27 ENCOUNTER — OFFICE VISIT (OUTPATIENT)
Age: 59
End: 2024-03-27
Payer: COMMERCIAL

## 2024-03-27 VITALS
WEIGHT: 159 LBS | DIASTOLIC BLOOD PRESSURE: 84 MMHG | TEMPERATURE: 97.9 F | OXYGEN SATURATION: 98 % | HEIGHT: 60 IN | HEART RATE: 87 BPM | SYSTOLIC BLOOD PRESSURE: 114 MMHG | BODY MASS INDEX: 31.22 KG/M2

## 2024-03-27 DIAGNOSIS — Z87.09 HISTORY OF PNEUMOTHORAX: ICD-10-CM

## 2024-03-27 DIAGNOSIS — I26.99 PULMONARY EMBOLISM, UNSPECIFIED CHRONICITY, UNSPECIFIED PULMONARY EMBOLISM TYPE, UNSPECIFIED WHETHER ACUTE COR PULMONALE PRESENT (HCC): Primary | ICD-10-CM

## 2024-03-27 DIAGNOSIS — I82.4Y1 ACUTE DEEP VEIN THROMBOSIS (DVT) OF PROXIMAL VEIN OF RIGHT LOWER EXTREMITY (HCC): ICD-10-CM

## 2024-03-27 PROCEDURE — 99214 OFFICE O/P EST MOD 30 MIN: CPT | Performed by: INTERNAL MEDICINE

## 2024-03-27 RX ORDER — FLUTICASONE PROPIONATE 50 MCG
SPRAY, SUSPENSION (ML) NASAL
COMMUNITY
Start: 2024-03-03

## 2024-03-27 RX ORDER — CLONAZEPAM 0.5 MG/1
0.5 TABLET ORAL 2 TIMES DAILY
COMMUNITY

## 2024-03-27 RX ORDER — DEXTROMETHORPHAN HYDROBROMIDE AND PROMETHAZINE HYDROCHLORIDE 15; 6.25 MG/5ML; MG/5ML
SYRUP ORAL
COMMUNITY
Start: 2024-03-03

## 2024-03-27 RX ORDER — RIMEGEPANT SULFATE 75 MG/75MG
TABLET, ORALLY DISINTEGRATING ORAL
COMMUNITY
Start: 2024-03-13

## 2024-03-27 RX ORDER — TOPIRAMATE 50 MG/1
50 TABLET, FILM COATED ORAL EVERY 12 HOURS SCHEDULED
COMMUNITY

## 2024-03-27 NOTE — PROGRESS NOTES
"Assessment/Plan:   Diagnoses and all orders for this visit:    Pulmonary embolism, unspecified chronicity, unspecified pulmonary embolism type, unspecified whether acute cor pulmonale present (HCC)  -     Ambulatory Referral to Hematology / Oncology; Future  -     apixaban (Eliquis) 5 mg; Take 1 tablet (5 mg total) by mouth 2 (two) times a day    Acute deep vein thrombosis (DVT) of proximal vein of right lower extremity (HCC)  -      VAS VENOUS DUPLEX - LOWER LIMB BILATERAL; Future    History of pneumothorax    Other orders  -     promethazine-dextromethorphan (PHENERGAN-DM) 6.25-15 mg/5 mL oral syrup; TAKE 5 ML BY MOUTH EVERY 4 TO 6 HOURS FOR 10 DAYS AS NEEDED FOR COUGH  -     Nurtec 75 MG TBDP  -     fluticasone (FLONASE) 50 mcg/act nasal spray; into each nostril  -     topiramate (TOPAMAX) 50 MG tablet; Take 50 mg by mouth every 12 (twelve) hours  -     clonazePAM (KlonoPIN) 0.5 mg tablet; Take 0.5 mg by mouth 2 (two) times a day        Currently saturating 98% on room air moving air well both sides.  And discussed with the patient, lung has completely expanded, and the risks of recurrent pneumothorax with a small bleb underlying discussed,   Recurrent pulmonary embolism although the hypercoagulable workup in the past and 2011 was negative, discussed regarding lifelong anticoagulation but she will also follow-up with hematology she had followed up with hematology in the past in [place]  I put in a referral here for [facility] hematology.  Continue with Eliquis 5 mg 1 tablet twice daily  Will follow-up with the patient in 6 months or earlier as needed  Return in about 6 months (around 9/27/2024).  All questions are answered to the patient's satisfaction and understanding.  She verbalizes understanding.  She is encouraged to call with any further questions or concerns.    Portions of the record may have been created with voice recognition software.  Occasional wrong word or \"sound a like\" substitutions may " have occurred due to the inherent limitations of voice recognition software.  Read the chart carefully and recognize, using context, where substitutions have occurred.    Electronically Signed by Angelique Uriostegui MD    ______________________________________________________________________    Chief Complaint:   Chief Complaint   Patient presents with    Follow-up       Patient ID: Lazara is a 58 y.o. y.o. female has no past medical history on file.    3/27/2024  Patient presents today for follow-up visit.  Lazara is a very pleasant 58-year-old lady, with past medical history of DVT in 2011 postoperative, she was on anticoagulation and then discontinued after 6 months also hypercoagulable workup at that time was negative currently coming in with upper respiratory infection, 2 weeks with coughing, outpatient treatment with antibiotics and steroids, still having significant shortness of breath for which she came in.  Also with history of being on hormone replacement therapy for a few weeks and stopped recently, and also has been on Ozempic for the last 2 months  Chest x-ray at the time of admission demonstrating left-sided pneumothorax status post chest tube placement and lung reexpanded and chest tube removed   Also CT angiogram demonstrating several segmental and subsegmental emboli and Doppler ultrasounds demonstrating acute lower extremity DVT on the right.  Currently on anticoagulation  Here for office follow-up she states she feels better than the hospital admission still does not feel completely well she states to go back to work now, some discomfort in the chest she states but no pain at all no coughing spells no shortness of breath when she walks.            Review of Systems   Constitutional: Negative.    HENT: Negative.     Eyes: Negative.    Respiratory: Negative.     Cardiovascular: Negative.    Gastrointestinal: Negative.    Endocrine: Negative.    Genitourinary: Negative.    Musculoskeletal: Negative.     Allergic/Immunologic: Negative.    Neurological: Negative.    Hematological: Negative.    Psychiatric/Behavioral: Negative.         Smoking history: She reports that she has never smoked. She does not have any smokeless tobacco history on file.    The following portions of the patient's history were reviewed and updated as appropriate: allergies, current medications, past family history, past medical history, past social history, past surgical history, and problem list.      There is no immunization history on file for this patient.  Current Outpatient Medications   Medication Sig Dispense Refill    apixaban (Eliquis) 5 mg Take 2 tablets (10 mg total) by mouth 2 (two) times a day for 7 days, THEN 1 tablet (5 mg total) 2 (two) times a day for 23 days. 74 tablet 0    apixaban (Eliquis) 5 mg Take 1 tablet (5 mg total) by mouth 2 (two) times a day 180 tablet 1    clonazePAM (KlonoPIN) 0.5 mg tablet Take 0.5 mg by mouth 2 (two) times a day      fluticasone (FLONASE) 50 mcg/act nasal spray into each nostril      glucosamine-chondroitin 500-400 MG tablet Take 1 tablet by mouth 3 (three) times a day      Nurtec 75 MG TBDP       promethazine-dextromethorphan (PHENERGAN-DM) 6.25-15 mg/5 mL oral syrup TAKE 5 ML BY MOUTH EVERY 4 TO 6 HOURS FOR 10 DAYS AS NEEDED FOR COUGH      topiramate (TOPAMAX) 50 MG tablet Take 50 mg by mouth every 12 (twelve) hours      lidocaine (LIDODERM) 5 % Apply 1 patch topically over 12 hours daily Remove & Discard patch within 12 hours or as directed by MD 30 patch 0     No current facility-administered medications for this visit.     Allergies: Patient has no known allergies.    Objective:  Vitals:    03/27/24 1042   BP: 114/84   Pulse: 87   Temp: 97.9 °F (36.6 °C)   SpO2: 98%   Weight: 72.1 kg (159 lb)   Height: 5' (1.524 m)   Oxygen Therapy  SpO2: 98 %  .  Wt Readings from Last 3 Encounters:   03/27/24 72.1 kg (159 lb)   03/13/24 70.8 kg (156 lb)   12/14/22 68 kg (150 lb)     Body mass index is  31.05 kg/m².    Physical Exam  Vitals and nursing note reviewed.   Constitutional:       Appearance: She is well-developed.   HENT:      Head: Normocephalic and atraumatic.   Eyes:      Conjunctiva/sclera: Conjunctivae normal.      Pupils: Pupils are equal, round, and reactive to light.   Neck:      Thyroid: No thyromegaly.      Vascular: No JVD.   Cardiovascular:      Rate and Rhythm: Normal rate and regular rhythm.      Heart sounds: Normal heart sounds. No murmur heard.     No friction rub. No gallop.   Pulmonary:      Effort: Pulmonary effort is normal. No respiratory distress.      Breath sounds: Normal breath sounds. No wheezing or rales.   Chest:      Chest wall: No tenderness.   Musculoskeletal:         General: No tenderness or deformity. Normal range of motion.      Cervical back: Normal range of motion and neck supple.   Lymphadenopathy:      Cervical: No cervical adenopathy.   Skin:     General: Skin is warm and dry.   Neurological:      Mental Status: She is alert and oriented to person, place, and time.         Lab Review:   Admission on 03/12/2024, Discharged on 03/14/2024   Component Date Value    Ventricular Rate 03/12/2024 103     Atrial Rate 03/12/2024 103     AR Interval 03/12/2024 166     QRSD Interval 03/12/2024 72     QT Interval 03/12/2024 358     QTC Interval 03/12/2024 468     P Axis 03/12/2024 72     QRS Axis 03/12/2024 100     T Wave Fleischmanns 03/12/2024 63     WBC 03/12/2024 6.87     RBC 03/12/2024 5.23 (H)     Hemoglobin 03/12/2024 16.3 (H)     Hematocrit 03/12/2024 47.9 (H)     MCV 03/12/2024 92     MCH 03/12/2024 31.2     MCHC 03/12/2024 34.0     RDW 03/12/2024 14.4     MPV 03/12/2024 9.1     Platelets 03/12/2024 326     nRBC 03/12/2024 0     Neutrophils Relative 03/12/2024 43     Immature Grans % 03/12/2024 0     Lymphocytes Relative 03/12/2024 44     Monocytes Relative 03/12/2024 10     Eosinophils Relative 03/12/2024 2     Basophils Relative 03/12/2024 1     Neutrophils Absolute  03/12/2024 2.97     Absolute Immature Grans 03/12/2024 0.02     Absolute Lymphocytes 03/12/2024 3.04     Absolute Monocytes 03/12/2024 0.65     Eosinophils Absolute 03/12/2024 0.15     Basophils Absolute 03/12/2024 0.04     Protime 03/12/2024 13.0     INR 03/12/2024 0.93     PTT 03/12/2024 29     SARS-CoV-2 03/12/2024 Negative     INFLUENZA A PCR 03/12/2024 Negative     INFLUENZA B PCR 03/12/2024 Negative     RSV PCR 03/12/2024 Negative     Sodium 03/12/2024 139     Potassium 03/12/2024 3.7     Chloride 03/12/2024 106     CO2 03/12/2024 24     ANION GAP 03/12/2024 9     BUN 03/12/2024 17     Creatinine 03/12/2024 0.98     Glucose 03/12/2024 90     Calcium 03/12/2024 9.6     eGFR 03/12/2024 64     Total Bilirubin 03/12/2024 0.69     Bilirubin, Direct 03/12/2024 0.08     Alkaline Phosphatase 03/12/2024 73     AST 03/12/2024 13     ALT 03/12/2024 28     Total Protein 03/12/2024 7.7     Albumin 03/12/2024 4.6     Preg, Serum 03/12/2024 Negative     Magnesium 03/12/2024 2.2     D-Dimer, Quant 03/12/2024 2.29 (H)     hs TnI 0hr 03/12/2024 <2     BNP 03/12/2024 6     hs TnI 2hr 03/12/2024 <2     Delta 2hr hsTnI 03/12/2024      LACTIC ACID 03/13/2024 1.1     BSA 03/13/2024 1.68     A4C EF 03/13/2024 66     LVIDd 03/13/2024 3.70     LVIDS 03/13/2024 2.40     IVSd 03/13/2024 1.10     LVPWd 03/13/2024 0.90     LVOT peak VTI 03/13/2024 14.3     FS 03/13/2024 35     MV E' Tissue Velocity Se* 03/13/2024 10     LA Volume Index (BP) 03/13/2024 11.3     E/A ratio 03/13/2024 0.99     E wave deceleration time 03/13/2024 135     MV Peak E Armando 03/13/2024 71     MV Peak A Armando 03/13/2024 0.72     AV LVOT peak gradient 03/13/2024 2     LVOT peak armando 03/13/2024 0.75     RVID d 03/13/2024 3.3     Tricuspid annular plane * 03/13/2024 1.80     LA size 03/13/2024 3     LA/Ao Ratio 2D 03/13/2024 0.97     LA volume (BP) 03/13/2024 19     LVOT mn grad 03/13/2024 2.0     MV stenosis pressure 1/2* 03/13/2024 40     MV valve area p 1/2 meth*  03/13/2024 5.50     Ao root 03/13/2024 3.10     Asc Ao 03/13/2024 3.6     Left ventricular stroke * 03/13/2024 37.00     IVS 03/13/2024 1.1     LEFT VENTRICLE SYSTOLIC * 03/13/2024 20     LV DIASTOLIC VOLUME (MOD* 03/13/2024 57     Left Atrium Area-systoli* 03/13/2024 10.9     Left Atrium Area-systoli* 03/13/2024 7.8     LVSV, 2D 03/13/2024 37     LV EF 03/13/2024 60     Procalcitonin 03/12/2024 <0.05     PTT 03/13/2024 119 (H)     WBC 03/13/2024 9.72     RBC 03/13/2024 4.70     Hemoglobin 03/13/2024 14.3     Hematocrit 03/13/2024 43.1     MCV 03/13/2024 92     MCH 03/13/2024 30.4     MCHC 03/13/2024 33.2     RDW 03/13/2024 14.4     MPV 03/13/2024 8.9     Platelets 03/13/2024 274     nRBC 03/13/2024 0     Neutrophils Relative 03/13/2024 65     Immature Grans % 03/13/2024 0     Lymphocytes Relative 03/13/2024 26     Monocytes Relative 03/13/2024 8     Eosinophils Relative 03/13/2024 0     Basophils Relative 03/13/2024 1     Neutrophils Absolute 03/13/2024 6.26     Absolute Immature Grans 03/13/2024 0.04     Absolute Lymphocytes 03/13/2024 2.56     Absolute Monocytes 03/13/2024 0.80     Eosinophils Absolute 03/13/2024 0.01     Basophils Absolute 03/13/2024 0.05     Sodium 03/13/2024 140     Potassium 03/13/2024 3.8     Chloride 03/13/2024 112 (H)     CO2 03/13/2024 21     ANION GAP 03/13/2024 7     BUN 03/13/2024 9     Creatinine 03/13/2024 0.64     Glucose 03/13/2024 99     Calcium 03/13/2024 8.6     eGFR 03/13/2024 100     Magnesium 03/13/2024 2.1     Phosphorus 03/13/2024 2.8     Protime 03/13/2024 14.4     INR 03/13/2024 1.05     Calcium, Ionized 03/13/2024 1.14     Procalcitonin 03/13/2024 <0.05     Hemoglobin A1C 03/13/2024 5.7 (H)     EAG 03/13/2024 117     Cholesterol 03/13/2024 187     Triglycerides 03/13/2024 59     HDL, Direct 03/13/2024 50     LDL Calculated 03/13/2024 125 (H)     Non-HDL-Chol (CHOL-HDL) 03/13/2024 137     PTT 03/13/2024 72 (H)     PTT 03/13/2024 71 (H)     WBC 03/14/2024 5.84     RBC  03/14/2024 4.28     Hemoglobin 03/14/2024 13.3     Hematocrit 03/14/2024 39.4     MCV 03/14/2024 92     MCH 03/14/2024 31.1     MCHC 03/14/2024 33.8     RDW 03/14/2024 14.7     Platelets 03/14/2024 260     MPV 03/14/2024 8.9     Sodium 03/14/2024 140     Potassium 03/14/2024 3.7     Chloride 03/14/2024 109 (H)     CO2 03/14/2024 24     ANION GAP 03/14/2024 7     BUN 03/14/2024 11     Creatinine 03/14/2024 0.70     Glucose 03/14/2024 114     Calcium 03/14/2024 8.7     eGFR 03/14/2024 97     Magnesium 03/14/2024 2.1     Phosphorus 03/14/2024 4.1     PTT 03/14/2024 74 (H)        Diagnostics:  I have personally reviewed pertinent films in PACS  Chest x-ray 3/14/2024  XR chest portable ICU    Result Date: 3/14/2024  Narrative: XR CHEST PORTABLE ICU INDICATION: Left sided pneumothorax to water seal. COMPARISON: 3/12/2024. FINDINGS: No pneumothorax. Left chest tube unchanged in position. Clear lungs. No pneumothorax or pleural effusion. Normal cardiomediastinal silhouette. Bones are unremarkable for age. Normal upper abdomen.     Impression: No pneumothorax. Workstation performed: [id number]     XR chest portable ICU    Result Date: 3/14/2024  Narrative: XR CHEST PORTABLE ICU INDICATION: Follow-up left pneumothorax following clamping of left chest tube. COMPARISON: Chest x-ray from yesterday. FINDINGS: Left chest tube is unchanged in position. No pneumothorax. Clear lungs. No pneumothorax or pleural effusion. Normal cardiomediastinal silhouette. Bones are unremarkable for age. Normal upper abdomen.     Impression: No pneumothorax. Clear lungs.. Workstation performed: [id number]      VAS VENOUS DUPLEX - LOWER LIMB BILATERAL    Result Date: 3/13/2024  Narrative:  [facility] REPORT [facility]IN[facility]: Indications: Patient presents with recent discovery of pulmonary embolism and physician wants to determine potential source.  Patient reports SOB Risk Factors The patient has no history of DVT.  FINDINGS:  Right       Impression               PostTibial  Occlusive Subsegmental  Peroneal    Occlusive Subsegmental     CONCLUSION:  Impression: RIGHT LOWER LIMB: There is evidence of acute occlusive deep vein thrombosis noted in one of the posterior tibial and paired peroneal veins. No evidence of superficial thrombophlebitis noted. Doppler evaluation shows a normal response to augmentation maneuvers.. Popliteal, posterior tibial and anterior tibial arterial Doppler waveform's are triphasic.  LEFT LOWER LIMB: No evidence of acute or chronic deep vein thrombosis. No evidence of superficial thrombophlebitis noted. Doppler evaluation shows a normal response to augmentation maneuvers. Popliteal, posterior tibial and anterior tibial arterial Doppler waveform's are triphasic.  Technical findings were given to Osman Andino PA-C  SIGNATURE: Electronically Signed by: WANDER JACKSON MD, RPVI on 2024-03-13 01:58:14 PM    XR chest 1 view portable    Result Date: 3/13/2024  Narrative: XR CHEST PORTABLE INDICATION: post chest tube placement. COMPARISON: 3/12/2024 FINDINGS: Left chest tube present with tip directed upward towards the apex. There is some atelectasis of the left lower lung field and there is limited inspiration. Left pneumothorax is no longer seen. No significant effusion. Normal cardiomediastinal silhouette. No mediastinal shift Bones are unremarkable for age. Normal upper abdomen.     Impression: Resolution of left pneumothorax after chest tube placement. Some left basilar atelectasis is noted Workstation performed: [id number]     XR chest 2 views    Result Date: 3/13/2024  Narrative: XR CHEST PA & LATERAL INDICATION: chest tightness/SOB. COMPARISON: None FINDINGS: There is a large left pneumothorax with compression of the left lung. The trachea is midline given mild rotation. The right lung is clear. No pleural effusion is seen. Normal cardiomediastinal silhouette. Bones are unremarkable for age. Normal upper abdomen.     Impression: Large  left pneumothorax without obvious mediastinal shift. At the time of the interpretation, the pneumothorax was already reported on patient's CT, and chest tube was inserted. Workstation performed: [id number]     Echo complete w/ contrast if indicated    Result Date: 3/13/2024  Narrative:   Left Ventricle: Left ventricular cavity size is normal. Wall thickness is normal. The left ventricular ejection fraction is 60%. Systolic function is normal. Wall motion is normal. Diastolic function is normal.   Mitral Valve: There is mild annular calcification.     XR chest 2 views    Result Date: 3/12/2024  Narrative: XR CHEST PA & LATERAL INDICATION: s/p chest tube placement. COMPARISON: Two-view chest from earlier today. FINDINGS: Resolved left pneumothorax status post left-sided chest tube placement. Chest tube tip is located in the medial aspect of the left hemithorax. Clear lungs. No pleural effusion. Normal cardiomediastinal silhouette. Bones are unremarkable for age. Normal upper abdomen.     Impression: Resolved left pneumothorax status post chest tube placement. Workstation performed: [id number]     CTA ED chest PE study    Result Date: 3/12/2024  Narrative: CTA - CHEST WITH IV CONTRAST - PULMONARY ANGIOGRAM INDICATION:   SOB, chest pain, elevated d-dimer, h/o b/l PE in 2011. COMPARISON: Chest radiograph from today. TECHNIQUE: CT angiogram timed for optimal opacification of the pulmonary arteries.  Axial, sagittal, and coronal 2D reformats created from source data.  Coronal 3D MIP postprocessing on the acquisition scanner. Radiation dose length product (DLP):  312 mGy-cm .  Radiation dose exposure minimized using iterative reconstruction and automated exposure control. IV Contrast:  85 mL of iohexol (OMNIPAQUE) FINDINGS: PULMONARY ARTERIES: Several subsegmental emboli in all lobes of the right lung. LUNGS: Moderate atelectasis in the left lung. AIRWAYS: No significant filling defects. PLEURA: Large left pneumothorax  with no mediastinal shift. HEART/GREAT VESSELS: RV/LV diameter ratio 1.0 cm, borderline for right heart strain. MEDIASTINUM AND DREW: Small hiatal hernia. CHEST WALL AND LOWER NECK: Subcentimeter thyroid nodules. No follow-up needed. Breast implants. UPPER ABDOMEN: Cholelithiasis. Left renal cyst. OSSEOUS STRUCTURES:  Normal for age.     Impression: Several segmental and subsegmental emboli in all lobes of the right lung. RV/LV diameter ratio at the upper limit of of normal at 1.0, borderline for right heart strain. Large left pneumothorax with extensive atelectasis of the left lung with no mediastinal shift. Small hiatal hernia. Cholelithiasis. I personally discussed this study with THANH MALONE on 3/12/2024 7:54 PM. Workstation performed: [id number]

## 2024-03-27 NOTE — LETTER
March 28, 2024     Patient: Lazara Ivy  YOB: 1965  Date of Visit: 3/27/2024      To Whom it May Concern:    Lazara Ivy is under my professional care. Lazara was seen in my office on 3/27/2024. Lazara may return to work on 4/15/2024 .    If you have any questions or concerns, please don't hesitate to call.  [phone]       Sincerely,          Angelique Uriostegui MD        CC: No Recipients

## 2024-03-28 ENCOUNTER — TELEPHONE (OUTPATIENT)
Dept: HEMATOLOGY ONCOLOGY | Facility: CLINIC | Age: 59
End: 2024-03-28

## 2024-05-14 ENCOUNTER — TELEPHONE (OUTPATIENT)
Dept: HEMATOLOGY ONCOLOGY | Facility: CLINIC | Age: 59
End: 2024-05-14

## 2024-05-14 NOTE — TELEPHONE ENCOUNTER
Appointment Confirmation   Who are you speaking with? Patient   If it is not the patient, are they listed on an active communication consent form? N/A   Which provider is the appointment scheduled with?  Bonny Syed    When is the appointment scheduled?  Please list date and time 9/20/24   At which location is the appointment scheduled to take place? [place]   Did caller verbalize understanding of appointment details? Yes

## 2024-05-16 ENCOUNTER — TELEPHONE (OUTPATIENT)
Dept: HEMATOLOGY ONCOLOGY | Facility: CLINIC | Age: 59
End: 2024-05-16

## 2024-05-16 NOTE — TELEPHONE ENCOUNTER
Appointment Change  Cancel, Reschedule, Change to Virtual      Who are you speaking with? Patient   If it is not the patient, is the caller listed on the communication consent form? N/A   Which provider is the appointment scheduled with? Bonny Syed    When was the original appointment scheduled?    Please list date and time 09/20/2024 @ 1PM    At which location is the appointment scheduled to take place? [place]   Was the appointment rescheduled?     Was the appointment changed from an in person visit to a virtual visit?    If so, please list the details of the change. 07/05/2024 @1PM    What is the reason for the appointment change? Requested sooner appt

## 2024-06-06 ENCOUNTER — TELEPHONE (OUTPATIENT)
Dept: HEMATOLOGY ONCOLOGY | Facility: CLINIC | Age: 59
End: 2024-06-06

## 2024-06-06 NOTE — TELEPHONE ENCOUNTER
Appointment Confirmation   Who are you speaking with? Patient   If it is not the patient, are they listed on an active communication consent form? N/A   Which provider is the appointment scheduled with?  Bonny Syed    When is the appointment scheduled?  Please list date and time 7/5/24 1:00pm   At which location is the appointment scheduled to take place? [place]   Did caller verbalize understanding of appointment details? Yes

## 2024-07-05 ENCOUNTER — OFFICE VISIT (OUTPATIENT)
Dept: HEMATOLOGY ONCOLOGY | Facility: CLINIC | Age: 59
End: 2024-07-05
Payer: COMMERCIAL

## 2024-07-05 VITALS
BODY MASS INDEX: 30.04 KG/M2 | OXYGEN SATURATION: 97 % | RESPIRATION RATE: 15 BRPM | DIASTOLIC BLOOD PRESSURE: 80 MMHG | HEIGHT: 60 IN | SYSTOLIC BLOOD PRESSURE: 118 MMHG | TEMPERATURE: 97.2 F | HEART RATE: 85 BPM | WEIGHT: 153 LBS

## 2024-07-05 DIAGNOSIS — I26.99 PULMONARY EMBOLISM, UNSPECIFIED CHRONICITY, UNSPECIFIED PULMONARY EMBOLISM TYPE, UNSPECIFIED WHETHER ACUTE COR PULMONALE PRESENT (HCC): ICD-10-CM

## 2024-07-05 PROCEDURE — 99243 OFF/OP CNSLTJ NEW/EST LOW 30: CPT | Performed by: PHYSICIAN ASSISTANT

## 2024-07-05 RX ORDER — PHENTERMINE HYDROCHLORIDE 15 MG/1
15 CAPSULE ORAL EVERY MORNING
COMMUNITY

## 2024-07-05 NOTE — PROGRESS NOTES
[facility]ATOLOGY [facility] [address]  [facility] [place] [address]  Hematology Ambulatory Consult  Lazara Ivy, 1965, [id number]  7/5/2024      Assessment and Plan   1. Pulmonary embolism, unspecified chronicity, unspecified pulmonary embolism type, unspecified whether acute cor pulmonale present (HCC)  - Ambulatory Referral to Hematology / Oncology  - CBC and differential; Future    59 year old female with history of DVT who is seen in consultation for recurrent DVT/PE.  Her first DVT occurred in 2011. Per pt, this was provoked following surgery. She had recurrent DVT/multiple PE in R lung recently in 03/2024. At this time she was also found to have large L pneumothorax. Patient reports URI and recent hormone use prior to event. No other major provoking factors.     Her first VTE occurred before 52 years old     Discussion/Plan   Obtain thrombosis panel, prothrombin gene mutation, Factor 5 Leiden   Porfirio attempt to obtain prior hematology notes   Continue Eliquis 5mg po BID  Patient would likely benefit from lifelong anticoagulation as long as her bleeding risk remains low.   Patient understands while being on anticoagulation that should she be in a significant accident such as a car accident and/or hit her head she should report to the ED to rule out fatal bleed.  She understands as well that it is rare to have a blood clot on top of anticoagulation.  However, should she develop significant chest pain, shortness of breath, leg swelling once again she should report to the ED.   RTC      Patient voiced agreement and understanding to the above.   Patient knows to call the Hematology/Oncology office with any questions and concerns regarding the above.    Barrier(s) to care: None.    Bonny Syed PA-C  Medical Oncology/Hematology  [facility]    Subjective   No chief complaint on file.      Referring provider    Angelique Uriostegui  "MD  [address] [facility]  2nd Floor  [place] [id number]    History of present illness: 59 year old female with history of DVT in 2011 who presents as consultation for recurrent DVT/PE.     History of bilateral PE in 2011 which occurred few days following breast lift/implants and tummy tuck. Was seen by hematology at [facility]. She reportedly had genetic testing that was negative. Was on some anticoagulation for at least 6 months.     Patient presented to the emergency room for dyspnea, chest tightness on 03/12/2024.  She was found to have several subsegmental emboli in all lobes of the right lung and left pneumothorax with extensive atelectasis.  Doppler ultrasound study at the time showed acute occlusive DVT of posterior tibial and paired peroneal veins.  Left lower extremity was normal.  She had chest tube placed for pneumothorax with resolution.  Patient was initially on heparin later transitioned to Eliquis 5mg po BID.     After discussion with the patient, she reports she was \"sick as a dog\" 2 weeks prior to her ER presentation. She had URI/bronchitis and was having coughing, sneezing. Went to urgent care and tested negative for COVID/flu. In January she was started on hormone replacement therapy for 3-4 weeks from January - February. She was on ozempic from feb -march for around 5 weeks. No trauma, immobilization, surgery prior to event. She travels to [facility] frequently. No tobacco use. No known autoimmune conditions.     Imaging   03/12/2024: CTA showed several subsegmental emboli in all lobes of the right lung.  Large left pneumothorax with extensive atelectasis.    03/12/2024 VAS duplex LE: acute occlusive DVT posterior tibial and paired peroneal veins.  Left lower extremity normal.    07/05/24: No chest pain, shortness of breath, pain or swelling in LE. No blood in stools or urine.     Review of Systems   All other systems reviewed and are " negative.      No past medical history on file.  Past Surgical History:   Procedure Laterality Date    BREAST IMPLANT      BREAST IMPLANT       SECTION      KNEE SURGERY      TONSILLECTOMY       No family history on file.  Social History     Socioeconomic History    Marital status: Single     Spouse name: Not on file    Number of children: Not on file    Years of education: Not on file    Highest education level: Not on file   Occupational History    Not on file   Tobacco Use    Smoking status: Never    Smokeless tobacco: Not on file   Vaping Use    Vaping status: Never Used   Substance and Sexual Activity    Alcohol use: Not Currently     Alcohol/week: 3.0 standard drinks of alcohol     Types: 3 Glasses of wine per week    Drug use: No    Sexual activity: Not on file   Other Topics Concern    Not on file   Social History Narrative    Not on file     Social Determinants of Health     Financial Resource Strain: Not on file   Food Insecurity: No Food Insecurity (3/14/2024)    Hunger Vital Sign     Worried About Running Out of Food in the Last Year: Never true     Ran Out of Food in the Last Year: Never true   Transportation Needs: No Transportation Needs (3/14/2024)    PRAPARE - Transportation     Lack of Transportation (Medical): No     Lack of Transportation (Non-Medical): No   Physical Activity: Not on file   Stress: Not on file   Social Connections: Not on file   Intimate Partner Violence: Not on file   Housing Stability: Low Risk  (3/14/2024)    Housing Stability Vital Sign     Unable to Pay for Housing in the Last Year: No     Number of Times Moved in the Last Year: 1     Homeless in the Last Year: No         Current Outpatient Medications:     apixaban (Eliquis) 5 mg, Take 2 tablets (10 mg total) by mouth 2 (two) times a day for 7 days, THEN 1 tablet (5 mg total) 2 (two) times a day for 23 days., Disp: 74 tablet, Rfl: 0    apixaban (Eliquis) 5 mg, Take 1 tablet (5 mg total) by mouth 2 (two) times a day,  Disp: 180 tablet, Rfl: 1    clonazePAM (KlonoPIN) 0.5 mg tablet, Take 0.5 mg by mouth 2 (two) times a day, Disp: , Rfl:     fluticasone (FLONASE) 50 mcg/act nasal spray, into each nostril, Disp: , Rfl:     glucosamine-chondroitin 500-400 MG tablet, Take 1 tablet by mouth 3 (three) times a day, Disp: , Rfl:     lidocaine (LIDODERM) 5 %, Apply 1 patch topically over 12 hours daily Remove & Discard patch within 12 hours or as directed by MD, Disp: 30 patch, Rfl: 0    Nurtec 75 MG TBDP, , Disp: , Rfl:     promethazine-dextromethorphan (PHENERGAN-DM) 6.25-15 mg/5 mL oral syrup, TAKE 5 ML BY MOUTH EVERY 4 TO 6 HOURS FOR 10 DAYS AS NEEDED FOR COUGH, Disp: , Rfl:     topiramate (TOPAMAX) 50 MG tablet, Take 50 mg by mouth every 12 (twelve) hours, Disp: , Rfl:   No Known Allergies    Objective   LMP 07/26/2017   Physical Exam  Vitals reviewed.   HENT:      Head: Normocephalic.   Cardiovascular:      Rate and Rhythm: Normal rate and regular rhythm.   Pulmonary:      Effort: Pulmonary effort is normal.      Breath sounds: Normal breath sounds.   Musculoskeletal:      Cervical back: Neck supple.   Skin:     Findings: No rash.   Neurological:      Mental Status: She is alert.         Result Review  Labs:  No visits with results within 30 Day(s) from this visit.   Latest known visit with results is:   Admission on 03/12/2024, Discharged on 03/14/2024   Component Date Value Ref Range Status    Ventricular Rate 03/12/2024 103  BPM Final    Atrial Rate 03/12/2024 103  BPM Final    MT Interval 03/12/2024 166  ms Final    QRSD Interval 03/12/2024 72  ms Final    QT Interval 03/12/2024 358  ms Final    QTC Interval 03/12/2024 468  ms Final    P Axis 03/12/2024 72  degrees Final    QRS [place] 03/12/2024 100  degrees Final    T Wave [place] 03/12/2024 63  degrees Final    WBC 03/12/2024 6.87  4.31 - 10.16 Thousand/uL Final    RBC 03/12/2024 5.23 (H)  3.81 - 5.12 Million/uL Final    Hemoglobin 03/12/2024 16.3 (H)  11.5 - 15.4 g/dL Final     Hematocrit 03/12/2024 47.9 (H)  34.8 - 46.1 % Final    MCV 03/12/2024 92  82 - 98 fL Final    MCH 03/12/2024 31.2  26.8 - 34.3 pg Final    MCHC 03/12/2024 34.0  31.4 - 37.4 g/dL Final    RDW 03/12/2024 14.4  11.6 - 15.1 % Final    MPV 03/12/2024 9.1  8.9 - 12.7 fL Final    Platelets 03/12/2024 326  149 - 390 Thousands/uL Final    nRBC 03/12/2024 0  /100 WBCs Final    Segmented % 03/12/2024 43  43 - 75 % Final    Immature Grans % 03/12/2024 0  0 - 2 % Final    Lymphocytes % 03/12/2024 44  14 - 44 % Final    Monocytes % 03/12/2024 10  4 - 12 % Final    Eosinophils Relative 03/12/2024 2  0 - 6 % Final    Basophils Relative 03/12/2024 1  0 - 1 % Final    Absolute Neutrophils 03/12/2024 2.97  1.85 - 7.62 Thousands/µL Final    Absolute Immature Grans 03/12/2024 0.02  0.00 - 0.20 Thousand/uL Final    Absolute Lymphocytes 03/12/2024 3.04  0.60 - 4.47 Thousands/µL Final    Absolute Monocytes 03/12/2024 0.65  0.17 - 1.22 Thousand/µL Final    Eosinophils Absolute 03/12/2024 0.15  0.00 - 0.61 Thousand/µL Final    Basophils Absolute 03/12/2024 0.04  0.00 - 0.10 Thousands/µL Final    Protime 03/12/2024 13.0  11.6 - 14.5 seconds Final    INR 03/12/2024 0.93  0.84 - 1.19 Final    PTT 03/12/2024 29  23 - 37 seconds Final    Therapeutic Heparin Range =  60-90 seconds    SARS-CoV-2 03/12/2024 Negative  Negative Final    INFLUENZA A PCR 03/12/2024 Negative  Negative Final    INFLUENZA B PCR 03/12/2024 Negative  Negative Final    RSV PCR 03/12/2024 Negative  Negative Final    Sodium 03/12/2024 139  135 - 147 mmol/L Final    Potassium 03/12/2024 3.7  3.5 - 5.3 mmol/L Final    Chloride 03/12/2024 106  96 - 108 mmol/L Final    CO2 03/12/2024 24  21 - 32 mmol/L Final    ANION GAP 03/12/2024 9  4 - 13 mmol/L Final    BUN 03/12/2024 17  5 - 25 mg/dL Final    Creatinine 03/12/2024 0.98  0.60 - 1.30 mg/dL Final    Standardized to IDMS reference method    Glucose 03/12/2024 90  65 - 140 mg/dL Final    If the patient is fasting, the ADA then  "defines impaired fasting glucose as > 100 mg/dL and diabetes as > or equal to 123 mg/dL.    Calcium 03/12/2024 9.6  8.4 - 10.2 mg/dL Final    eGFR 03/12/2024 64  ml/min/1.73sq m Final    Total Bilirubin 03/12/2024 0.69  0.20 - 1.00 mg/dL Final    Use of this assay is not recommended for patients undergoing treatment with eltrombopag due to the potential for falsely elevated results.  N-acetyl-p-benzoquinone imine (metabolite of Acetaminophen) will generate erroneously low results in samples for patients that have taken an overdose of Acetaminophen.    Bilirubin, Direct 03/12/2024 0.08  0.00 - 0.20 mg/dL Final    Alkaline Phosphatase 03/12/2024 73  34 - 104 U/L Final    AST 03/12/2024 13  13 - 39 U/L Final    ALT 03/12/2024 28  7 - 52 U/L Final    Specimen collection should occur prior to Sulfasalazine administration due to the potential for falsely depressed results.     Total Protein 03/12/2024 7.7  6.4 - 8.4 g/dL Final    Albumin 03/12/2024 4.6  3.5 - 5.0 g/dL Final    Preg, Serum 03/12/2024 Negative  Negative Final    Magnesium 03/12/2024 2.2  1.9 - 2.7 mg/dL Final    D-Dimer, Quant 03/12/2024 2.29 (H)  <0.50 ug/ml FEU Final    Reference and upper limits to exclude DVT and PE are the same.  Do not use to exclude if clinical symptoms are present.  Pregnant women:  1st trimester:  <0.22 - 1.06 ug/ml FEU  2nd trimester:  <0.22 - 1.88 ug/ml FEU  3rd trimester:   0.24 - 3.28 ug/ml FEU    Note: Normal ranges may not apply to patients who are transgender, non-binary, or whose legal sex, sex at birth, and gender identity differ.      hs TnI 0hr 03/12/2024 <2  \"Refer to ACS Flowchart\"- see link ng/L Final    Comment:                                              Initial (time 0) result  If >=50 ng/L, Myocardial injury suggested ;  Type of myocardial injury and treatment strategy  to be determined.  If 5-49 ng/L, a delta result at 2 hours and or 4 hours will be needed to further evaluate.  If <4 ng/L, and chest pain has " "been >3 hours since onset, patient may qualify for discharge based on the HEART score in the ED.  If <5 ng/L and <3hours since onset of chest pain, a delta result at 2 hours will be needed to further evaluate.    HS Troponin 99th Percentile URL of a Health Population=12 ng/L with a 95% Confidence Interval of 8-18 ng/L.    Second Troponin (time 2 hours)  If calculated delta >= 20 ng/L,  Myocardial injury suggested ; Type of myocardial injury and treatment strategy to be determined.  If 5-49 ng/L and the calculated delta is 5-19 ng/L, consult medical service for evaluation.  Continue evaluation for ischemia on ecg and other possible etiology and repeat hs troponin at 4 hours.  If delta                            is <5 ng/L at 2 hours, consider discharge based on risk stratification via the HEART score (if in ED), or JENNIFER risk score in IP/Observation.    HS Troponin 99th Percentile URL of a Health Population=12 ng/L with a 95% Confidence Interval of 8-18 ng/L.    BNP 03/12/2024 6  0 - 100 pg/mL Final    hs TnI 2hr 03/12/2024 <2  \"Refer to ACS Flowchart\"- see link ng/L Final    Comment:                                              Initial (time 0) result  If >=50 ng/L, Myocardial injury suggested ;  Type of myocardial injury and treatment strategy  to be determined.  If 5-49 ng/L, a delta result at 2 hours and or 4 hours will be needed to further evaluate.  If <4 ng/L, and chest pain has been >3 hours since onset, patient may qualify for discharge based on the HEART score in the ED.  If <5 ng/L and <3hours since onset of chest pain, a delta result at 2 hours will be needed to further evaluate.    HS Troponin 99th Percentile URL of a Health Population=12 ng/L with a 95% Confidence Interval of 8-18 ng/L.    Second Troponin (time 2 hours)  If calculated delta >= 20 ng/L,  Myocardial injury suggested ; Type of myocardial injury and treatment strategy to be determined.  If 5-49 ng/L and the calculated delta is 5-19 ng/L, " consult medical service for evaluation.  Continue evaluation for ischemia on ecg and other possible etiology and repeat hs troponin at 4 hours.  If delta                            is <5 ng/L at 2 hours, consider discharge based on risk stratification via the HEART score (if in ED), or JENNIFER risk score in IP/Observation.    HS Troponin 99th Percentile URL of a Health Population=12 ng/L with a 95% Confidence Interval of 8-18 ng/L.    Delta 2hr hsTnI 03/12/2024    Final    Unable to Calculate    LACTIC ACID 03/13/2024 1.1  0.5 - 2.0 mmol/L Final    BSA 03/13/2024 1.68  m2 Final    A4C EF 03/13/2024 66  % Final    LVIDd 03/13/2024 3.70  cm Final    LVIDS 03/13/2024 2.40  cm Final    IVSd 03/13/2024 1.10  cm Final    LVPWd 03/13/2024 0.90  cm Final    LVOT peak VTI 03/13/2024 14.3  cm Final    FS 03/13/2024 35  28 - 44 Final    MV E' Tissue Velocity Septal 03/13/2024 10  cm/s Final    LA Volume Index (BP) 03/13/2024 11.3  mL/m2 Final    E/A ratio 03/13/2024 0.99   Final    E wave deceleration time 03/13/2024 135  ms Final    MV Peak E Armando 03/13/2024 71  cm/s Final    MV Peak A Armando 03/13/2024 0.72  m/s Final    AV LVOT peak gradient 03/13/2024 2  mmHg Final    LVOT peak armando 03/13/2024 0.75  m/s Final    RVID d 03/13/2024 3.3  cm Final    Tricuspid annular plane systolic e* 03/13/2024 1.80  cm Final    LA size 03/13/2024 3  cm Final    LA/Ao Ratio 2D 03/13/2024 0.97   Final    LA volume (BP) 03/13/2024 19  mL Final    LVOT mn grad 03/13/2024 2.0  mmHg Final    MV stenosis pressure 1/2 time 03/13/2024 40  ms Final    MV valve area p 1/2 method 03/13/2024 5.50   Final    Ao root 03/13/2024 3.10  cm Final    Asc Ao 03/13/2024 3.6  cm Final    Left ventricular stroke volume (2D) 03/13/2024 37.00  mL Final    IVS 03/13/2024 1.1  cm Final    LEFT VENTRICLE SYSTOLIC VOLUME (MO* 03/13/2024 20  mL Final    LV DIASTOLIC VOLUME (MOD BIPLANE) * 03/13/2024 57  mL Final    Left Atrium Area-systolic Four Nicole* 03/13/2024 10.9  cm2 Final     Left Atrium Area-systolic Apical T* 03/13/2024 7.8  cm2 Final    LVSV, 2D 03/13/2024 37  mL Final    LV EF 03/13/2024 60   Final    Procalcitonin 03/12/2024 <0.05  <=0.25 ng/ml Final    Comment: Suspected Lower Respiratory Tract Infection (LRTI):  - LESS than or EQUAL to 0.25 ng/mL:   low likelihood for bacterial LRTI; antibiotics DISCOURAGED.  - GREATER than 0.25 ng/mL:   increased likelihood for bacterial LRTI; antibiotics ENCOURAGED.    Suspected Sepsis:  - Strongly consider initiating antibiotics in ALL UNSTABLE patients.  - LESS than or EQUAL to 0.5 ng/mL:   low likelihood for bacterial sepsis; antibiotics DISCOURAGED.  - GREATER than 0.5 ng/mL:   increased likelihood for bacterial sepsis; antibiotics ENCOURAGED.  - GREATER than 2 ng/mL:   high risk for severe sepsis / septic shock; antibiotics strongly ENCOURAGED.    Decisions on antibiotic use should not be based solely on Procalcitonin (PCT) levels. If PCT is low but uncertainty exists with stopping antibiotics, repeat PCT in 6-24 hours to confirm the low level. If antibiotics are administered (regardless if initial PCT was high or low), repeat PCT every 1-2 days to consider early antibiotic cessation (when GREATER                            than 80% decrease from the peak OR when PCT drops below designated cutoffs, whichever comes first), so long as the infection is NOT one that typically requires prolonged treatment durations (e.g., bone/joint infections, endocarditis, Staph. aureus bacteremia).    Situations of FALSE-POSITIVE Procalcitonin values:  1) Newborns < 72 hours old  2) Massive stress from severe trauma / burns, major surgery, acute pancreatitis, cardiogenic / hemorrhagic shock, sickle cell crisis, or other organ perfusion abnormalities  3) Malaria and some Candidal infections  4) Treatment with agents that stimulate cytokines (e.g., OKT3, anti-lymphocyte globulins, alemtuzumab, IL-2, granulocyte transfusion [NOT GCSFs])  5) Chronic renal  disease causes elevated baseline levels (consider GREATER than 0.75 ng/mL as an abnormal cut-off); initiating HD/CRRT may cause transient decreases  6) Paraneoplastic syndromes from medullary thyroid or SCLC, some forms of vasculitis, and acute [id number]                            disease    Situations of FALSE-NEGATIVE Procalcitonin values:  1) Too early in clinical course for PCT to have reached its peak (may repeat in 6-24 hours to confirm low level)  2) Localized infection WITHOUT systemic (SIRS / sepsis) response (e.g., an abscess, osteomyelitis, cystitis)  3) Mycobacteria (e.g., Tuberculosis, MAC)  4) Cystic fibrosis exacerbations      PTT 03/13/2024 119 (H)  23 - 37 seconds Final    Therapeutic Heparin Range =  60-90 seconds    WBC 03/13/2024 9.72  4.31 - 10.16 Thousand/uL Final    RBC 03/13/2024 4.70  3.81 - 5.12 Million/uL Final    Hemoglobin 03/13/2024 14.3  11.5 - 15.4 g/dL Final    Hematocrit 03/13/2024 43.1  34.8 - 46.1 % Final    MCV 03/13/2024 92  82 - 98 fL Final    MCH 03/13/2024 30.4  26.8 - 34.3 pg Final    MCHC 03/13/2024 33.2  31.4 - 37.4 g/dL Final    RDW 03/13/2024 14.4  11.6 - 15.1 % Final    MPV 03/13/2024 8.9  8.9 - 12.7 fL Final    Platelets 03/13/2024 274  149 - 390 Thousands/uL Final    nRBC 03/13/2024 0  /100 WBCs Final    Segmented % 03/13/2024 65  43 - 75 % Final    Immature Grans % 03/13/2024 0  0 - 2 % Final    Lymphocytes % 03/13/2024 26  14 - 44 % Final    Monocytes % 03/13/2024 8  4 - 12 % Final    Eosinophils Relative 03/13/2024 0  0 - 6 % Final    Basophils Relative 03/13/2024 1  0 - 1 % Final    Absolute Neutrophils 03/13/2024 6.26  1.85 - 7.62 Thousands/µL Final    Absolute Immature Grans 03/13/2024 0.04  0.00 - 0.20 Thousand/uL Final    Absolute Lymphocytes 03/13/2024 2.56  0.60 - 4.47 Thousands/µL Final    Absolute Monocytes 03/13/2024 0.80  0.17 - 1.22 Thousand/µL Final    Eosinophils Absolute 03/13/2024 0.01  0.00 - 0.61 Thousand/µL Final    Basophils Absolute  03/13/2024 0.05  0.00 - 0.10 Thousands/µL Final    Sodium 03/13/2024 140  135 - 147 mmol/L Final    Potassium 03/13/2024 3.8  3.5 - 5.3 mmol/L Final    Chloride 03/13/2024 112 (H)  96 - 108 mmol/L Final    CO2 03/13/2024 21  21 - 32 mmol/L Final    ANION GAP 03/13/2024 7  4 - 13 mmol/L Final    BUN 03/13/2024 9  5 - 25 mg/dL Final    Creatinine 03/13/2024 0.64  0.60 - 1.30 mg/dL Final    Standardized to IDMS reference method    Glucose 03/13/2024 99  65 - 140 mg/dL Final    If the patient is fasting, the ADA then defines impaired fasting glucose as > 100 mg/dL and diabetes as > or equal to 123 mg/dL.    Calcium 03/13/2024 8.6  8.4 - 10.2 mg/dL Final    eGFR 03/13/2024 100  ml/min/1.73sq m Final    Magnesium 03/13/2024 2.1  1.9 - 2.7 mg/dL Final    Phosphorus 03/13/2024 2.8  2.7 - 4.5 mg/dL Final    Protime 03/13/2024 14.4  11.6 - 14.5 seconds Final    INR 03/13/2024 1.05  0.84 - 1.19 Final    Calcium, Ionized 03/13/2024 1.14  1.12 - 1.32 mmol/L Final    Procalcitonin 03/13/2024 <0.05  <=0.25 ng/ml Final    Comment: Suspected Lower Respiratory Tract Infection (LRTI):  - LESS than or EQUAL to 0.25 ng/mL:   low likelihood for bacterial LRTI; antibiotics DISCOURAGED.  - GREATER than 0.25 ng/mL:   increased likelihood for bacterial LRTI; antibiotics ENCOURAGED.    Suspected Sepsis:  - Strongly consider initiating antibiotics in ALL UNSTABLE patients.  - LESS than or EQUAL to 0.5 ng/mL:   low likelihood for bacterial sepsis; antibiotics DISCOURAGED.  - GREATER than 0.5 ng/mL:   increased likelihood for bacterial sepsis; antibiotics ENCOURAGED.  - GREATER than 2 ng/mL:   high risk for severe sepsis / septic shock; antibiotics strongly ENCOURAGED.    Decisions on antibiotic use should not be based solely on Procalcitonin (PCT) levels. If PCT is low but uncertainty exists with stopping antibiotics, repeat PCT in 6-24 hours to confirm the low level. If antibiotics are administered (regardless if initial PCT was high or low),  repeat PCT every 1-2 days to consider early antibiotic cessation (when GREATER                            than 80% decrease from the peak OR when PCT drops below designated cutoffs, whichever comes first), so long as the infection is NOT one that typically requires prolonged treatment durations (e.g., bone/joint infections, endocarditis, Staph. aureus bacteremia).    Situations of FALSE-POSITIVE Procalcitonin values:  1) Newborns < 72 hours old  2) Massive stress from severe trauma / burns, major surgery, acute pancreatitis, cardiogenic / hemorrhagic shock, sickle cell crisis, or other organ perfusion abnormalities  3) Malaria and some Candidal infections  4) Treatment with agents that stimulate cytokines (e.g., OKT3, anti-lymphocyte globulins, alemtuzumab, IL-2, granulocyte transfusion [NOT GCSFs])  5) Chronic renal disease causes elevated baseline levels (consider GREATER than 0.75 ng/mL as an abnormal cut-off); initiating HD/CRRT may cause transient decreases  6) Paraneoplastic syndromes from medullary thyroid or SCLC, some forms of vasculitis, and acute [id number]                            disease    Situations of FALSE-NEGATIVE Procalcitonin values:  1) Too early in clinical course for PCT to have reached its peak (may repeat in 6-24 hours to confirm low level)  2) Localized infection WITHOUT systemic (SIRS / sepsis) response (e.g., an abscess, osteomyelitis, cystitis)  3) Mycobacteria (e.g., Tuberculosis, MAC)  4) Cystic fibrosis exacerbations      Hemoglobin A1C 03/13/2024 5.7 (H)  Normal 4.0-5.6%; PreDiabetic 5.7-6.4%; Diabetic >=6.5%; Glycemic control for adults with diabetes <7.0% % Final    EAG 03/13/2024 117  mg/dl Final    Cholesterol 03/13/2024 187  See Comment mg/dL Final    Cholesterol:         Pediatric <18 Years        Desirable          <170 mg/dL      Borderline High    170-199 mg/dL      High               >=200 mg/dL        Adult >=18 Years            Desirable         <200 mg/dL       Borderline High   200-239 mg/dL      High              >239 mg/dL      Triglycerides 03/13/2024 59  See Comment mg/dL Final    Triglyceride:     0-9Y            <75mg/dL     10Y-17Y         <90 mg/dL       >=18Y     Normal          <150 mg/dL     Borderline High 150-199 mg/dL     High            200-499 mg/dL        Very High       >499 mg/dL    Specimen collection should occur prior to Metamizole administration due to the potential for falsely depressed results.    HDL, Direct 03/13/2024 50  >=50 mg/dL Final    LDL Calculated 03/13/2024 125 (H)  0 - 100 mg/dL Final    LDL Cholesterol:     Optimal           <100 mg/dl     Near Optimal      100-129 mg/dl     Above Optimal       Borderline High 130-159 mg/dl       High            160-189 mg/dl       Very High       >189 mg/dl         This screening LDL is a calculated result.   It does not have the accuracy of the Direct Measured LDL in the monitoring of patients with hyperlipidemia and/or statin therapy.   Direct Measure LDL (RHY085) must be ordered separately in these patients.    Non-HDL-Chol (CHOL-HDL) 03/13/2024 137  mg/dl Final    PTT 03/13/2024 72 (H)  23 - 37 seconds Final    Therapeutic Heparin Range =  60-90 seconds    PTT 03/13/2024 71 (H)  23 - 37 seconds Final    Therapeutic Heparin Range =  60-90 seconds    WBC 03/14/2024 5.84  4.31 - 10.16 Thousand/uL Final    RBC 03/14/2024 4.28  3.81 - 5.12 Million/uL Final    Hemoglobin 03/14/2024 13.3  11.5 - 15.4 g/dL Final    Hematocrit 03/14/2024 39.4  34.8 - 46.1 % Final    MCV 03/14/2024 92  82 - 98 fL Final    MCH 03/14/2024 31.1  26.8 - 34.3 pg Final    MCHC 03/14/2024 33.8  31.4 - 37.4 g/dL Final    RDW 03/14/2024 14.7  11.6 - 15.1 % Final    Platelets 03/14/2024 260  149 - 390 Thousands/uL Final    MPV 03/14/2024 8.9  8.9 - 12.7 fL Final    Sodium 03/14/2024 140  135 - 147 mmol/L Final    Potassium 03/14/2024 3.7  3.5 - 5.3 mmol/L Final    Chloride 03/14/2024 109 (H)  96 - 108 mmol/L Final    CO2 03/14/2024  24  21 - 32 mmol/L Final    ANION GAP 03/14/2024 7  4 - 13 mmol/L Final    BUN 03/14/2024 11  5 - 25 mg/dL Final    Creatinine 03/14/2024 0.70  0.60 - 1.30 mg/dL Final    Standardized to IDMS reference method    Glucose 03/14/2024 114  65 - 140 mg/dL Final    If the patient is fasting, the ADA then defines impaired fasting glucose as > 100 mg/dL and diabetes as > or equal to 123 mg/dL.    Calcium 03/14/2024 8.7  8.4 - 10.2 mg/dL Final    eGFR 03/14/2024 97  ml/min/1.73sq m Final    Magnesium 03/14/2024 2.1  1.9 - 2.7 mg/dL Final    Phosphorus 03/14/2024 4.1  2.7 - 4.5 mg/dL Final    PTT 03/14/2024 74 (H)  23 - 37 seconds Final    Therapeutic Heparin Range =  60-90 seconds       Imaging:   Reviewed relevant imaging   Please note:  This report has been generated by a voice recognition software system. Therefore there may be syntax, spelling, and/or grammatical errors. Please call if you have any questions.

## 2024-07-22 NOTE — TELEPHONE ENCOUNTER
I called Lazara in response to a referral that was received for patient to establish care with Hematology.    Outreach was made to schedule a consultation.    A consultation was scheduled for patient during this call. Patient is scheduled on 9/20/24 at 1 with Bonny at the [place] campus     Head Injury, Pediatric  There are many types of head injuries. They can be as minor as a bump. Some head injuries can be worse. Worse injuries include:    A strong hit to the head that hurts the brain (concussion).  A bruise of the brain (contusion). This means there is bleeding in the brain that can cause swelling.  A cracked skull (skull fracture).  Bleeding in the brain that gathers, gets thick (makes a clot), and forms a bump (hematoma).    ImageMost problems from a head injury come in the first 24 hours. However, your child may still have side effects up to 7–10 days after the injury. It is important to watch your child's condition for any changes.    Follow these instructions at home:  Medicines     Give over-the-counter and prescription medicines only as told by your child's doctor.  Do not give your child aspirin because of the association with Reye syndrome.  Activity     Have your child:    Rest as much as possible. Rest helps the brain heal.  Avoid activities that are hard or tiring.    Make sure your child gets enough sleep.  Limit activities that need a lot of thought or attention, such as:    Watching TV.  Playing memory games and puzzles.  Doing homework.  Working on the computer, social media, and texting.    Keep your child from activities that could cause another head injury, such as:    Riding a bicycle.  Playing sports.  Playing in gym class or recess.  Climbing on a playground.    Ask your child's doctor when it is safe for your child to return to his or her normal activities. Ask your child's doctor for a step-by-step plan for your child to slowly go back to activities.  General instructions     Watch your child carefully for symptoms that are new or getting worse. This is very important in the first 24 hours after the head injury.  Keep all follow-up visits as told by your child's doctor. This is important.  Tell all of your child's teachers and other caregivers about your child's injury, symptoms, and activity restrictions. Have them report any problems that are new or getting worse.  How is this prevented?  Your child should:    Wear a seatbelt when he or she is in a moving vehicle.  Use the right-sized car seat or booster seat when in a moving vehicle.  Wear a helmet when:    Riding a bicycle.  Skiing.  Doing any other sport or activity that has a risk of injury.      You can:    Make your home safer for your child.    Childproof any dangerous parts of your home.  Install window guards and safety stoddard.    Make sure the playground that your child uses is safe.    Get help right away if:  Your child has:    A very bad (severe) headache that is not helped by medicine.  Clear or bloody fluid coming from his or her nose or ears.  Changes in his or her seeing (vision).  Jerky movements that he or she cannot control (seizure).    Your child's symptoms get worse.  Your child throws up (vomits).  Your child's dizziness gets worse.  Your child cannot walk or does not have control over his or her arms or legs.  Your child will not stop crying.  Your child passes out.  You cannot wake up your child.  Your child is sleepier and has trouble staying awake.  Your child will not eat or nurse.  The black centers of your child's eyes (pupils) change in size.  These symptoms may be an emergency. Do not wait to see if the symptoms will go away. Get medical help right away. Call your local emergency services (911 in the [place]

## 2024-09-13 ENCOUNTER — HOSPITAL ENCOUNTER (OUTPATIENT)
Dept: VASCULAR ULTRASOUND | Facility: HOSPITAL | Age: 59
Discharge: HOME | End: 2024-09-13
Attending: INTERNAL MEDICINE
Payer: COMMERCIAL

## 2024-09-13 DIAGNOSIS — I82.4Y1 ACUTE DEEP VEIN THROMBOSIS (DVT) OF PROXIMAL VEIN OF RIGHT LOWER EXTREMITY (HCC): ICD-10-CM

## 2024-09-13 PROCEDURE — 93970 EXTREMITY STUDY: CPT

## 2024-09-15 PROCEDURE — 93970 EXTREMITY STUDY: CPT | Performed by: SURGERY

## 2024-09-16 ENCOUNTER — OFFICE VISIT (OUTPATIENT)
Age: 59
End: 2024-09-16
Payer: COMMERCIAL

## 2024-09-16 VITALS
RESPIRATION RATE: 16 BRPM | WEIGHT: 143 LBS | TEMPERATURE: 97.9 F | OXYGEN SATURATION: 98 % | BODY MASS INDEX: 28.07 KG/M2 | SYSTOLIC BLOOD PRESSURE: 116 MMHG | DIASTOLIC BLOOD PRESSURE: 72 MMHG | HEIGHT: 60 IN | HEART RATE: 89 BPM

## 2024-09-16 DIAGNOSIS — R05.3 CHRONIC COUGH: ICD-10-CM

## 2024-09-16 DIAGNOSIS — I26.99 PULMONARY EMBOLISM, UNSPECIFIED CHRONICITY, UNSPECIFIED PULMONARY EMBOLISM TYPE, UNSPECIFIED WHETHER ACUTE COR PULMONALE PRESENT (HCC): Primary | ICD-10-CM

## 2024-09-16 PROCEDURE — 99214 OFFICE O/P EST MOD 30 MIN: CPT | Performed by: INTERNAL MEDICINE

## 2024-09-16 RX ORDER — BENZONATATE 100 MG/1
100 CAPSULE ORAL 3 TIMES DAILY PRN
Qty: 30 CAPSULE | Refills: 0 | Status: SHIPPED | OUTPATIENT
Start: 2024-09-16

## 2024-09-16 RX ORDER — DEXTROMETHORPHAN HYDROBROMIDE AND PROMETHAZINE HYDROCHLORIDE 15; 6.25 MG/5ML; MG/5ML
2.5 SYRUP ORAL 2 TIMES DAILY PRN
Qty: 240 ML | Refills: 0 | Status: SHIPPED | OUTPATIENT
Start: 2024-09-16

## 2024-09-16 NOTE — PROGRESS NOTES
"Assessment/Plan:   Diagnoses and all orders for this visit:    Pulmonary embolism, unspecified chronicity, unspecified pulmonary embolism type, unspecified whether acute cor pulmonale present (HCC)  -     apixaban (Eliquis) 5 mg; Take 1 tablet (5 mg total) by mouth 2 (two) times a day    Chronic cough  -     promethazine-dextromethorphan (PHENERGAN-DM) 6.25-15 mg/5 mL oral syrup; Take 2.5 mL by mouth 2 (two) times a day as needed for cough  -     benzonatate (TESSALON PERLES) 100 mg capsule; Take 1 capsule (100 mg total) by mouth 3 (three) times a day as needed for cough    Recurrent pulmonary embolism although the hypercoagulable workup in the past in 2011 was negative, discussed regarding lifelong anticoagulation  She also followed up with recently with hematology  Continue with Eliquis 5 mg 1 tablet twice daily  Currently does not have any cough but concerned that in case she has any upper respiratory infection cough with a recent pneumothorax that she had with a small bleb, I have sent in promethazine cough syrup along with benzonatate Perles to keep in case she has any cough also she will give us a call if there is any significant symptoms understands and verbalizes in also discussed regarding any sudden onset shortness of breath or chest pain she will go into the ER right away understands and verbalizes  Follow-up in 1 year or earlier as needed    No follow-ups on file.  All questions are answered to the patient's satisfaction and understanding.  She verbalizes understanding.  She is encouraged to call with any further questions or concerns.    Portions of the record may have been created with voice recognition software.  Occasional wrong word or \"sound a like\" substitutions may have occurred due to the inherent limitations of voice recognition software.  Read the chart carefully and recognize, using context, where substitutions have occurred.    Electronically Signed by Angelique Uriostegui, " MD    ______________________________________________________________________    Chief Complaint:   Chief Complaint   Patient presents with    Follow-up       Patient ID: Lazara is a 59 y.o. y.o. female has a past medical history of Pneumothorax (1/11/2011).    9/16/2024  Patient presents today for follow-up visit.  Lazara is a very pleasant 59-year-old lady, with past medical history of DVT in 2011 postoperative, she was on anticoagulation and then discontinued after 6 months also hypercoagulable workup at that time was negative currently coming in with upper respiratory infection, 2 weeks with coughing, outpatient treatment with antibiotics and steroids, still having significant shortness of breath for which she came in.  Also with history of being on hormone replacement therapy for a few weeks and stopped recently, and also has been on Ozempic for the last 2 months  Chest x-ray at the time of admission demonstrating left-sided pneumothorax status post chest tube placement and lung reexpanded and chest tube removed   Also CT angiogram demonstrating several segmental and subsegmental emboli and Doppler ultrasounds demonstrating acute lower extremity DVT on the right.  Currently on anticoagulation  Here for office follow-up she states she feels better than the hospital admission still does not feel completely well she states to go back to work now, some discomfort in the chest she states but no pain at all no coughing spells no shortness of breath when she walks.  Here for a of follow-up 6 months, also seen the hematologist recently.          Review of Systems   Constitutional: Negative.    HENT: Negative.     Eyes: Negative.    Respiratory: Negative.     Cardiovascular: Negative.    Gastrointestinal: Negative.    Endocrine: Negative.    Genitourinary: Negative.    Musculoskeletal: Negative.    Allergic/Immunologic: Negative.    Neurological: Negative.    Psychiatric/Behavioral: Negative.         Smoking history: She  reports that she has never smoked. She has never used smokeless tobacco.    The following portions of the patient's history were reviewed and updated as appropriate: allergies, current medications, past family history, past medical history, past social history, past surgical history, and problem list.      There is no immunization history on file for this patient.  Current Outpatient Medications   Medication Sig Dispense Refill    apixaban (Eliquis) 5 mg Take 1 tablet (5 mg total) by mouth 2 (two) times a day 180 tablet 1    clonazePAM (KlonoPIN) 0.5 mg tablet Take 0.5 mg by mouth 2 (two) times a day      fluticasone (FLONASE) 50 mcg/act nasal spray into each nostril      glucosamine-chondroitin 500-400 MG tablet Take 1 tablet by mouth 3 (three) times a day      NON FORMULARY BOTOX injections every 3-4 months for migraines.      Nurtec 75 MG TBDP       phentermine 15 MG capsule Take 15 mg by mouth every morning      topiramate (TOPAMAX) 50 MG tablet Take 50 mg by mouth every 12 (twelve) hours      apixaban (Eliquis) 5 mg Take 2 tablets (10 mg total) by mouth 2 (two) times a day for 7 days, THEN 1 tablet (5 mg total) 2 (two) times a day for 23 days. 74 tablet 0    lidocaine (LIDODERM) 5 % Apply 1 patch topically over 12 hours daily Remove & Discard patch within 12 hours or as directed by MD (Patient not taking: Reported on 7/5/2024) 30 patch 0    promethazine-dextromethorphan (PHENERGAN-DM) 6.25-15 mg/5 mL oral syrup TAKE 5 ML BY MOUTH EVERY 4 TO 6 HOURS FOR 10 DAYS AS NEEDED FOR COUGH (Patient not taking: Reported on 7/5/2024)       No current facility-administered medications for this visit.     Allergies: Patient has no known allergies.    Objective:  Vitals:    09/16/24 1617 09/16/24 1619   BP: 116/72    BP Location: Right arm    Patient Position: Sitting    Cuff Size: Large    Pulse: 89    Resp: 16    Temp: 97.9 °F (36.6 °C)    SpO2: 98% 98%   Weight: 64.9 kg (143 lb)    Height: 5' (1.524 m)    Oxygen  Therapy  SpO2: 98 %  Oxygen Therapy: None (Room air)  .  Wt Readings from Last 3 Encounters:   09/16/24 64.9 kg (143 lb)   07/05/24 69.4 kg (153 lb)   03/27/24 72.1 kg (159 lb)     Body mass index is 27.93 kg/m².    Physical Exam  Vitals and nursing note reviewed.   Constitutional:       Appearance: She is well-developed.   HENT:      Head: Normocephalic and atraumatic.   Eyes:      Conjunctiva/sclera: Conjunctivae normal.      Pupils: Pupils are equal, round, and reactive to light.   Neck:      Thyroid: No thyromegaly.      Vascular: No JVD.   Cardiovascular:      Rate and Rhythm: Normal rate and regular rhythm.      Heart sounds: Normal heart sounds. No murmur heard.     No friction rub. No gallop.   Pulmonary:      Effort: Pulmonary effort is normal. No respiratory distress.      Breath sounds: Normal breath sounds. No wheezing or rales.   Chest:      Chest wall: No tenderness.   Musculoskeletal:         General: No tenderness or deformity. Normal range of motion.      Cervical back: Normal range of motion and neck supple.   Lymphadenopathy:      Cervical: No cervical adenopathy.   Skin:     General: Skin is warm and dry.   Neurological:      Mental Status: She is alert and oriented to person, place, and time.         Lab Review:   No visits with results within 6 Month(s) from this visit.   Latest known visit with results is:   Admission on 03/12/2024, Discharged on 03/14/2024   Component Date Value    Ventricular Rate 03/12/2024 103     Atrial Rate 03/12/2024 103     VT Interval 03/12/2024 166     QRSD Interval 03/12/2024 72     QT Interval 03/12/2024 358     QTC Interval 03/12/2024 468     P Axis 03/12/2024 72     QRS Axis 03/12/2024 100     T Wave [place] 03/12/2024 63     WBC 03/12/2024 6.87     RBC 03/12/2024 5.23 (H)     Hemoglobin 03/12/2024 16.3 (H)     Hematocrit 03/12/2024 47.9 (H)     MCV 03/12/2024 92     MCH 03/12/2024 31.2     MCHC 03/12/2024 34.0     RDW 03/12/2024 14.4     MPV 03/12/2024 9.1      Platelets 03/12/2024 326     nRBC 03/12/2024 0     Segmented % 03/12/2024 43     Immature Grans % 03/12/2024 0     Lymphocytes % 03/12/2024 44     Monocytes % 03/12/2024 10     Eosinophils Relative 03/12/2024 2     Basophils Relative 03/12/2024 1     Absolute Neutrophils 03/12/2024 2.97     Absolute Immature Grans 03/12/2024 0.02     Absolute Lymphocytes 03/12/2024 3.04     Absolute Monocytes 03/12/2024 0.65     Eosinophils Absolute 03/12/2024 0.15     Basophils Absolute 03/12/2024 0.04     Protime 03/12/2024 13.0     INR 03/12/2024 0.93     PTT 03/12/2024 29     SARS-CoV-2 03/12/2024 Negative     INFLUENZA A PCR 03/12/2024 Negative     INFLUENZA B PCR 03/12/2024 Negative     RSV PCR 03/12/2024 Negative     Sodium 03/12/2024 139     Potassium 03/12/2024 3.7     Chloride 03/12/2024 106     CO2 03/12/2024 24     ANION GAP 03/12/2024 9     BUN 03/12/2024 17     Creatinine 03/12/2024 0.98     Glucose 03/12/2024 90     Calcium 03/12/2024 9.6     eGFR 03/12/2024 64     Total Bilirubin 03/12/2024 0.69     Bilirubin, Direct 03/12/2024 0.08     Alkaline Phosphatase 03/12/2024 73     AST 03/12/2024 13     ALT 03/12/2024 28     Total Protein 03/12/2024 7.7     Albumin 03/12/2024 4.6     Preg, Serum 03/12/2024 Negative     Magnesium 03/12/2024 2.2     D-Dimer, Quant 03/12/2024 2.29 (H)     hs TnI 0hr 03/12/2024 <2     BNP 03/12/2024 6     hs TnI 2hr 03/12/2024 <2     Delta 2hr hsTnI 03/12/2024      LACTIC ACID 03/13/2024 1.1     BSA 03/13/2024 1.68     A4C EF 03/13/2024 66     LVIDd 03/13/2024 3.70     LVIDS 03/13/2024 2.40     IVSd 03/13/2024 1.10     LVPWd 03/13/2024 0.90     LVOT peak VTI 03/13/2024 14.3     FS 03/13/2024 35     MV E' Tissue Velocity Se* 03/13/2024 10     LA Volume Index (BP) 03/13/2024 11.3     E/A ratio 03/13/2024 0.99     E wave deceleration time 03/13/2024 135     MV Peak E Armando 03/13/2024 71     MV Peak A Arm[place] 03/13/2024 0.72     AV LVOT peak gradient 03/13/2024 2     LVOT peak armando 03/13/2024 0.75      RVID d 03/13/2024 3.3     Tricuspid annular plane * 03/13/2024 1.80     LA size 03/13/2024 3     LA/Ao Ratio 2D 03/13/2024 0.97     LA volume (BP) 03/13/2024 19     LVOT mn grad 03/13/2024 2.0     MV stenosis pressure 1/2* 03/13/2024 40     MV valve area p 1/2 meth* 03/13/2024 5.50     Ao root 03/13/2024 3.10     Asc Ao 03/13/2024 3.6     Left ventricular stroke * 03/13/2024 37.00     IVS 03/13/2024 1.1     LEFT VENTRICLE SYSTOLIC * 03/13/2024 20     LV DIASTOLIC VOLUME (MOD* 03/13/2024 57     Left Atrium Area-systoli* 03/13/2024 10.9     Left Atrium Area-systoli* 03/13/2024 7.8     LVSV, 2D 03/13/2024 37     LV EF 03/13/2024 60     Procalcitonin 03/12/2024 <0.05     PTT 03/13/2024 119 (H)     WBC 03/13/2024 9.72     RBC 03/13/2024 4.70     Hemoglobin 03/13/2024 14.3     Hematocrit 03/13/2024 43.1     MCV 03/13/2024 92     MCH 03/13/2024 30.4     MCHC 03/13/2024 33.2     RDW 03/13/2024 14.4     MPV 03/13/2024 8.9     Platelets 03/13/2024 274     nRBC 03/13/2024 0     Segmented % 03/13/2024 65     Immature Grans % 03/13/2024 0     Lymphocytes % 03/13/2024 26     Monocytes % 03/13/2024 8     Eosinophils Relative 03/13/2024 0     Basophils Relative 03/13/2024 1     Absolute Neutrophils 03/13/2024 6.26     Absolute Immature Grans 03/13/2024 0.04     Absolute Lymphocytes 03/13/2024 2.56     Absolute Monocytes 03/13/2024 0.80     Eosinophils Absolute 03/13/2024 0.01     Basophils Absolute 03/13/2024 0.05     Sodium 03/13/2024 140     Potassium 03/13/2024 3.8     Chloride 03/13/2024 112 (H)     CO2 03/13/2024 21     ANION GAP 03/13/2024 7     BUN 03/13/2024 9     Creatinine 03/13/2024 0.64     Glucose 03/13/2024 99     Calcium 03/13/2024 8.6     eGFR 03/13/2024 100     Magnesium 03/13/2024 2.1     Phosphorus 03/13/2024 2.8     Protime 03/13/2024 14.4     INR 03/13/2024 1.05     Calcium, Ionized 03/13/2024 1.14     Procalcitonin 03/13/2024 <0.05     Hemoglobin A1C 03/13/2024 5.7 (H)     EAG 03/13/2024 117     Cholesterol  03/13/2024 187     Triglycerides 03/13/2024 59     HDL, Direct 03/13/2024 50     LDL Calculated 03/13/2024 125 (H)     Non-HDL-Chol (CHOL-HDL) 03/13/2024 137     PTT 03/13/2024 72 (H)     PTT 03/13/2024 71 (H)     WBC 03/14/2024 5.84     RBC 03/14/2024 4.28     Hemoglobin 03/14/2024 13.3     Hematocrit 03/14/2024 39.4     MCV 03/14/2024 92     MCH 03/14/2024 31.1     MCHC 03/14/2024 33.8     RDW 03/14/2024 14.7     Platelets 03/14/2024 260     MPV 03/14/2024 8.9     Sodium 03/14/2024 140     Potassium 03/14/2024 3.7     Chloride 03/14/2024 109 (H)     CO2 03/14/2024 24     ANION GAP 03/14/2024 7     BUN 03/14/2024 11     Creatinine 03/14/2024 0.70     Glucose 03/14/2024 114     Calcium 03/14/2024 8.7     eGFR 03/14/2024 97     Magnesium 03/14/2024 2.1     Phosphorus 03/14/2024 4.1     PTT 03/14/2024 74 (H)        Diagnostics:  Personally reviewed the following image studies in PACS and associated radiology reports: Ultrasound(s). My interpretation of the radiology images/reports is: 9/13/2024 negative for DVT.  9/13/2024 ultrasound Doppler both lower extremities negative for DVT   VAS VENOUS DUPLEX - LOWER LIMB BILATERAL    Result Date: 9/15/2024  Narrative:  [facility]PORT [facility]INICAL: Indications: Patient presents to determine propagation vs resolution of previously noted DVT in the right calf discovered on 3/13/24.  Patient is asymptomatic. Operative History: No cardiovascular surgeries Risk Factors The patient has history of DVT.   CONCLUSION:  Impression: RIGHT LOWER LIMB: No evidence of acute or chronic deep vein thrombosis. No evidence of superficial thrombophlebitis noted. Doppler evaluation shows a normal response to augmentation maneuvers.. Popliteal, posterior tibial and anterior tibial arterial Doppler waveforms are triphasic.  LEFT LOWER LIMB: No evidence of acute or chronic deep vein thrombosis. No evidence of superficial thrombophlebitis noted. Doppler evaluation shows a normal response to  augmentation maneuvers. Popliteal, posterior tibial and anterior tibial arterial Doppler waveforms are triphasic.  In comparison to the study on 3/13/24, there is complete resolution of right lower extremity DVT.  SIGNATURE: Electronically Signed by: SCARLET HENNING MD on 2024-09-15 01:46:49 PM

## 2024-09-26 ENCOUNTER — TELEPHONE (OUTPATIENT)
Dept: HEMATOLOGY ONCOLOGY | Facility: CLINIC | Age: 59
End: 2024-09-26

## 2024-09-26 NOTE — TELEPHONE ENCOUNTER
Called Lazara and reminded her about lab work. She confirmed appt date and time and said she would get them done for appt.

## 2024-09-28 ENCOUNTER — APPOINTMENT (OUTPATIENT)
Dept: LAB | Facility: HOSPITAL | Age: 59
End: 2024-09-28
Payer: COMMERCIAL

## 2024-09-28 DIAGNOSIS — I26.99 PULMONARY EMBOLISM, UNSPECIFIED CHRONICITY, UNSPECIFIED PULMONARY EMBOLISM TYPE, UNSPECIFIED WHETHER ACUTE COR PULMONALE PRESENT (HCC): ICD-10-CM

## 2024-09-28 LAB
BASOPHILS # BLD AUTO: 0.04 THOUSANDS/ΜL (ref 0–0.1)
BASOPHILS NFR BLD AUTO: 1 % (ref 0–1)
EOSINOPHIL # BLD AUTO: 0.09 THOUSAND/ΜL (ref 0–0.61)
EOSINOPHIL NFR BLD AUTO: 2 % (ref 0–6)
ERYTHROCYTE [DISTWIDTH] IN BLOOD BY AUTOMATED COUNT: 14.6 % (ref 11.6–15.1)
HCT VFR BLD AUTO: 40.6 % (ref 34.8–46.1)
HGB BLD-MCNC: 13.7 G/DL (ref 11.5–15.4)
IMM GRANULOCYTES # BLD AUTO: 0.02 THOUSAND/UL (ref 0–0.2)
IMM GRANULOCYTES NFR BLD AUTO: 0 % (ref 0–2)
LYMPHOCYTES # BLD AUTO: 2.67 THOUSANDS/ΜL (ref 0.6–4.47)
LYMPHOCYTES NFR BLD AUTO: 58 % (ref 14–44)
MCH RBC QN AUTO: 31 PG (ref 26.8–34.3)
MCHC RBC AUTO-ENTMCNC: 33.7 G/DL (ref 31.4–37.4)
MCV RBC AUTO: 92 FL (ref 82–98)
MONOCYTES # BLD AUTO: 0.5 THOUSAND/ΜL (ref 0.17–1.22)
MONOCYTES NFR BLD AUTO: 11 % (ref 4–12)
NEUTROPHILS # BLD AUTO: 1.29 THOUSANDS/ΜL (ref 1.85–7.62)
NEUTS SEG NFR BLD AUTO: 28 % (ref 43–75)
NRBC BLD AUTO-RTO: 0 /100 WBCS
PLATELET # BLD AUTO: 332 THOUSANDS/UL (ref 149–390)
PMV BLD AUTO: 9.6 FL (ref 8.9–12.7)
RBC # BLD AUTO: 4.42 MILLION/UL (ref 3.81–5.12)
WBC # BLD AUTO: 4.61 THOUSAND/UL (ref 4.31–10.16)

## 2024-09-28 PROCEDURE — 85025 COMPLETE CBC W/AUTO DIFF WBC: CPT

## 2024-09-28 PROCEDURE — 36415 COLL VENOUS BLD VENIPUNCTURE: CPT

## 2024-10-04 ENCOUNTER — TELEPHONE (OUTPATIENT)
Dept: SURGICAL ONCOLOGY | Facility: CLINIC | Age: 59
End: 2024-10-04

## 2024-10-04 ENCOUNTER — OFFICE VISIT (OUTPATIENT)
Dept: HEMATOLOGY ONCOLOGY | Facility: CLINIC | Age: 59
End: 2024-10-04
Payer: COMMERCIAL

## 2024-10-04 VITALS
DIASTOLIC BLOOD PRESSURE: 64 MMHG | SYSTOLIC BLOOD PRESSURE: 106 MMHG | HEART RATE: 78 BPM | RESPIRATION RATE: 16 BRPM | TEMPERATURE: 97.9 F | WEIGHT: 144.5 LBS | BODY MASS INDEX: 28.37 KG/M2 | OXYGEN SATURATION: 96 % | HEIGHT: 60 IN

## 2024-10-04 DIAGNOSIS — I82.409 RECURRENT DEEP VEIN THROMBOSIS (DVT) (HCC): Primary | ICD-10-CM

## 2024-10-04 PROCEDURE — 99213 OFFICE O/P EST LOW 20 MIN: CPT | Performed by: PHYSICIAN ASSISTANT

## 2024-10-04 NOTE — TELEPHONE ENCOUNTER
Patient was provided with Release of Records consent form at end of appointment today with Bonny. Patient said she would fill them at home and bring them back this afternoon.

## 2024-10-04 NOTE — PROGRESS NOTES
[facility] HEMATOLOGY ONCOLOGY SPECIALISTS [place]  [address] [facility] [address]  Hematology Ambulatory Follow-Up  Lazara Ivy, 1965, [id number]  10/4/2024      Assessment and Plan   1. Recurrent deep vein thrombosis (DVT) (HCC)  - Prothrombin Gene Mutation; Future  - Factor 5 leiden; Future  - Thrombosis Panel; Future  - Basic metabolic panel; Future    59 year old female with history of DVT who is seen in follow up for recurrent DVT/PE.  Her first DVT occurred in 2011. Per pt, this was provoked following surgery. She had recurrent DVT/multiple PE in R lung recently in 03/2024. At this time she was also found to have large L pneumothorax. Patient reports URI and recent hormone use (progesterone pill, estrogen patch, vaginal estrogen cream) prior to event.  Has been on Eliquis 5 mg p.o. twice daily since, tolerating well.     Discussion/Plan   She will hold eliquis for 7 days and then obtain thrombosis panel. Resume ac immediately after blood draw. We will also obtain prothrombin gene mutation, Factor 5 Leiden which can be drawn anytime while on ac.   I will have out clinic team attempt to obtain prior hematology note  Patient would likely benefit from lifelong anticoagulation as long as her bleeding risk remains low.   Patient understands while being on anticoagulation that should she be in a significant accident such as a car accident and/or hit her head she should report to the ED to rule out fatal bleed.  She understands as well that it is rare to have a blood clot on top of anticoagulation.  However, should she develop significant chest pain, shortness of breath, leg swelling once again she should report to the ED.   RTC       Patient voiced agreement and understanding to the above.   Patient advised to call the Hematology/Oncology office with any questions and concerns regarding the above.    Barrier(s) to care: None  The patient is able to self care.    Bonny  "Yahaira LAMA   Medical Oncology/Hematology  [facility]    Subjective     Chief Complaint   Patient presents with    Follow-up       History of present illness: 59 year old female with history of DVT in 2011 who presents as follow up for recurrent DVT/PE.      History of bilateral PE in 2011 which occurred few days following breast lift/implants and tummy tuck. Was seen by hematology at [facility] hematology. She reportedly had genetic testing that was negative. Was on some anticoagulation for at least 6 months.     Patient presented to the emergency room for dyspnea, chest tightness on 03/12/2024.  She was found to have several subsegmental emboli in all lobes of the right lung and left pneumothorax with extensive atelectasis.  Doppler ultrasound study at the time showed acute occlusive DVT of posterior tibial and paired peroneal veins.  Left lower extremity was normal.  She had chest tube placed for pneumothorax with resolution.  Patient was initially on heparin later transitioned to Eliquis 5mg po BID.      After discussion with the patient, she reports she was \"sick as a dog\" 2 weeks prior to her ER presentation. She had URI/bronchitis and was having coughing, sneezing. Went to urgent care and tested negative for COVID/flu. In January she was started on hormone replacement therapy for 3-4 weeks from January - February. She was on ozempic from feb -march for around 5 weeks. No trauma, immobilization, surgery prior to event. She travels to [facility] frequently. No tobacco use. No known autoimmune conditions.      Imaging   03/12/2024: CTA showed several subsegmental emboli in all lobes of the right lung.  Large left pneumothorax with extensive atelectasis.     03/12/2024 VAS duplex LE: acute occlusive DVT posterior tibial and paired peroneal veins.  Left lower extremity normal.     07/05/24: No chest pain, shortness of breath, pain or " "swelling in LE. No blood in stools or urine.     2024: VAS duplex US: complete resolution of prior RLE DVT    10/04/24 :  No results found for: \"IRON\", \"TIBC\", \"FERRITIN\"  Lab Results   Component Value Date    WBC 4.61 2024    HGB 13.7 2024    HCT 40.6 2024    MCV 92 2024     2024       Interval history:    Review of Systems   All other systems reviewed and are negative.      Patient Active Problem List   Diagnosis    Pneumothorax, left    SIRS (systemic inflammatory response syndrome) (HCC)    Pulmonary embolism (HCC)    DVT (deep vein thrombosis) in pregnancy     Past Medical History:   Diagnosis Date    Pneumothorax 2011     Past Surgical History:   Procedure Laterality Date    BREAST IMPLANT      BREAST IMPLANT       SECTION      KNEE SURGERY      TONSILLECTOMY       Family History   Problem Relation Age of Onset    Colon cancer Mother     Cancer Mother         Colon cancer stage 4 - liver mets  2011     Social History     Socioeconomic History    Marital status: Single     Spouse name: None    Number of children: None    Years of education: None    Highest education level: None   Occupational History    None   Tobacco Use    Smoking status: Never    Smokeless tobacco: Never   Vaping Use    Vaping status: Never Used   Substance and Sexual Activity    Alcohol use: Not Currently     Alcohol/week: 3.0 standard drinks of alcohol     Types: 3 Glasses of wine per week    Drug use: No    Sexual activity: Yes     Partners: Male   Other Topics Concern    None   Social History Narrative    None     Social Determinants of Health     Financial Resource Strain: Not on file   Food Insecurity: No Food Insecurity (3/14/2024)    Hunger Vital Sign     Worried About Running Out of Food in the Last Year: Never true     Ran Out of Food in the Last Year: Never true   Transportation Needs: No Transportation Needs (3/14/2024)    PRAPARE - Transportation     Lack of " Transportation (Medical): No     Lack of Transportation (Non-Medical): No   Physical Activity: Not on file   Stress: Not on file   Social Connections: Not on file   Intimate Partner Violence: Not on file   Housing Stability: Low Risk  (3/14/2024)    Housing Stability Vital Sign     Unable to Pay for Housing in the Last Year: No     Number of Times Moved in the Last Year: 1     Homeless in the Last Year: No       Current Outpatient Medications:     apixaban (Eliquis) 5 mg, Take 1 tablet (5 mg total) by mouth 2 (two) times a day, Disp: 180 tablet, Rfl: 1    benzonatate (TESSALON PERLES) 100 mg capsule, Take 1 capsule (100 mg total) by mouth 3 (three) times a day as needed for cough, Disp: 30 capsule, Rfl: 0    clonazePAM (KlonoPIN) 0.5 mg tablet, Take 0.5 mg by mouth 2 (two) times a day, Disp: , Rfl:     fluticasone (FLONASE) 50 mcg/act nasal spray, into each nostril, Disp: , Rfl:     glucosamine-chondroitin 500-400 MG tablet, Take 1 tablet by mouth 3 (three) times a day, Disp: , Rfl:     NON FORMULARY, BOTOX injections every 3-4 months for migraines., Disp: , Rfl:     Nurtec 75 MG TBDP, , Disp: , Rfl:     phentermine 15 MG capsule, Take 15 mg by mouth every morning, Disp: , Rfl:     promethazine-dextromethorphan (PHENERGAN-DM) 6.25-15 mg/5 mL oral syrup, Take 2.5 mL by mouth 2 (two) times a day as needed for cough, Disp: 240 mL, Rfl: 0    topiramate (TOPAMAX) 50 MG tablet, Take 50 mg by mouth every 12 (twelve) hours, Disp: , Rfl:     apixaban (Eliquis) 5 mg, Take 2 tablets (10 mg total) by mouth 2 (two) times a day for 7 days, THEN 1 tablet (5 mg total) 2 (two) times a day for 23 days. (Patient not taking: Reported on 10/4/2024), Disp: 74 tablet, Rfl: 0    lidocaine (LIDODERM) 5 %, Apply 1 patch topically over 12 hours daily Remove & Discard patch within 12 hours or as directed by MD (Patient not taking: Reported on 7/5/2024), Disp: 30 patch, Rfl: 0  No Known Allergies    Objective   /64 (BP Location: Left arm,  Patient Position: Sitting, Cuff Size: Standard)   Pulse 78   Temp 97.9 °F (36.6 °C) (Temporal)   Resp 16   Ht 5' (1.524 m)   Wt 65.5 kg (144 lb 8 oz)   LMP 07/26/2017   SpO2 96%   BMI 28.22 kg/m²    Physical Exam  Vitals reviewed.   HENT:      Head: Normocephalic.   Cardiovascular:      Rate and Rhythm: Normal rate and regular rhythm.   Pulmonary:      Effort: Pulmonary effort is normal.      Breath sounds: Normal breath sounds.   Abdominal:      Palpations: Abdomen is soft.      Tenderness: There is no abdominal tenderness.   Musculoskeletal:      Cervical back: Neck supple.   Skin:     Findings: No rash.   Neurological:      Mental Status: She is alert.         Result Review  Labs:  Appointment on 09/28/2024   Component Date Value Ref Range Status    WBC 09/28/2024 4.61  4.31 - 10.16 Thousand/uL Final    RBC 09/28/2024 4.42  3.81 - 5.12 Million/uL Final    Hemoglobin 09/28/2024 13.7  11.5 - 15.4 g/dL Final    Hematocrit 09/28/2024 40.6  34.8 - 46.1 % Final    MCV 09/28/2024 92  82 - 98 fL Final    MCH 09/28/2024 31.0  26.8 - 34.3 pg Final    MCHC 09/28/2024 33.7  31.4 - 37.4 g/dL Final    RDW 09/28/2024 14.6  11.6 - 15.1 % Final    MPV 09/28/2024 9.6  8.9 - 12.7 fL Final    Platelets 09/28/2024 332  149 - 390 Thousands/uL Final    nRBC 09/28/2024 0  /100 WBCs Final    Segmented % 09/28/2024 28 (L)  43 - 75 % Final    Immature Grans % 09/28/2024 0  0 - 2 % Final    Lymphocytes % 09/28/2024 58 (H)  14 - 44 % Final    Monocytes % 09/28/2024 11  4 - 12 % Final    Eosinophils Relative 09/28/2024 2  0 - 6 % Final    Basophils Relative 09/28/2024 1  0 - 1 % Final    Absolute Neutrophils 09/28/2024 1.29 (L)  1.85 - 7.62 Thousands/µL Final    Absolute Immature Grans 09/28/2024 0.02  0.00 - 0.20 Thousand/uL Final    Absolute Lymphocytes 09/28/2024 2.67  0.60 - 4.47 Thousands/µL Final    Absolute Monocytes 09/28/2024 0.50  0.17 - 1.22 Thousand/µL Final    Eosinophils Absolute 09/28/2024 0.09  0.00 - 0.61 Thousand/µL  Final    Basophils Absolute 09/28/2024 0.04  0.00 - 0.10 Thousands/µL Final       Imaging:   I reviewed relevant imaging    Please note:  This report has been generated by a voice recognition software system. Therefore there may be syntax, spelling, and/or grammatical errors. Please call if you have any questions.

## 2024-10-12 ENCOUNTER — APPOINTMENT (OUTPATIENT)
Dept: LAB | Facility: HOSPITAL | Age: 59
End: 2024-10-12
Payer: COMMERCIAL

## 2024-10-12 DIAGNOSIS — I82.409 RECURRENT DEEP VEIN THROMBOSIS (DVT) (HCC): ICD-10-CM

## 2024-10-12 LAB
ANION GAP SERPL CALCULATED.3IONS-SCNC: 7 MMOL/L (ref 4–13)
BUN SERPL-MCNC: 17 MG/DL (ref 5–25)
CALCIUM SERPL-MCNC: 9.4 MG/DL (ref 8.4–10.2)
CHLORIDE SERPL-SCNC: 110 MMOL/L (ref 96–108)
CO2 SERPL-SCNC: 24 MMOL/L (ref 21–32)
CREAT SERPL-MCNC: 0.91 MG/DL (ref 0.6–1.3)
DEPRECATED AT III PPP: 113 % OF NORMAL (ref 92–136)
GFR SERPL CREATININE-BSD FRML MDRD: 70 ML/MIN/1.73SQ M
GLUCOSE P FAST SERPL-MCNC: 104 MG/DL (ref 65–99)
POTASSIUM SERPL-SCNC: 3.6 MMOL/L (ref 3.5–5.3)
SODIUM SERPL-SCNC: 141 MMOL/L (ref 135–147)

## 2024-10-12 PROCEDURE — 86147 CARDIOLIPIN ANTIBODY EA IG: CPT

## 2024-10-12 PROCEDURE — 85306 CLOT INHIBIT PROT S FREE: CPT

## 2024-10-12 PROCEDURE — 80048 BASIC METABOLIC PNL TOTAL CA: CPT

## 2024-10-12 PROCEDURE — 85303 CLOT INHIBIT PROT C ACTIVITY: CPT

## 2024-10-12 PROCEDURE — 85305 CLOT INHIBIT PROT S TOTAL: CPT

## 2024-10-12 PROCEDURE — 85613 RUSSELL VIPER VENOM DILUTED: CPT

## 2024-10-12 PROCEDURE — 85300 ANTITHROMBIN III ACTIVITY: CPT

## 2024-10-12 PROCEDURE — 85670 THROMBIN TIME PLASMA: CPT

## 2024-10-12 PROCEDURE — 36415 COLL VENOUS BLD VENIPUNCTURE: CPT

## 2024-10-12 PROCEDURE — 85732 THROMBOPLASTIN TIME PARTIAL: CPT

## 2024-10-12 PROCEDURE — 85705 THROMBOPLASTIN INHIBITION: CPT

## 2024-10-12 PROCEDURE — 86146 BETA-2 GLYCOPROTEIN ANTIBODY: CPT

## 2024-10-14 LAB
APTT SCREEN TO CONFIRM RATIO: 0.93 RATIO (ref 0–1.34)
CONFIRM APTT/NORMAL: 37.6 SEC (ref 0–47.6)
LA PPP-IMP: NORMAL
PROT C AG ACT/NOR PPP IA: 99 % OF NORMAL (ref 60–150)
PROT S ACT/NOR PPP: 110 % (ref 61–136)
PROT S ACT/NOR PPP: 98 % (ref 68–108)
PROT S PPP-ACNC: 100 % (ref 60–150)
SCREEN APTT: 31.8 SEC (ref 0–43.5)
SCREEN DRVVT: 33.7 SEC (ref 0–47)
THROMBIN TIME: 19.3 SEC (ref 0–23)

## 2024-10-15 LAB
B2 GLYCOPROT1 IGA SERPL IA-ACNC: 1.2
B2 GLYCOPROT1 IGG SERPL IA-ACNC: <0.8
B2 GLYCOPROT1 IGM SERPL IA-ACNC: 6.5
CARDIOLIPIN IGA SER IA-ACNC: 1.7
CARDIOLIPIN IGG SER IA-ACNC: 5.1
CARDIOLIPIN IGM SER IA-ACNC: 21

## 2024-10-19 ENCOUNTER — APPOINTMENT (OUTPATIENT)
Dept: LAB | Facility: HOSPITAL | Age: 59
End: 2024-10-19
Payer: COMMERCIAL

## 2024-10-19 DIAGNOSIS — I82.409 RECURRENT DEEP VEIN THROMBOSIS (DVT) (HCC): ICD-10-CM

## 2024-10-19 PROCEDURE — 81241 F5 GENE: CPT

## 2024-10-19 PROCEDURE — 81240 F2 GENE: CPT

## 2024-10-19 PROCEDURE — 36415 COLL VENOUS BLD VENIPUNCTURE: CPT

## 2024-10-25 LAB
F2 GENE MUT ANL BLD/T: NORMAL
F5 GENE MUT ANL BLD/T: NORMAL
Lab: NORMAL
Lab: NORMAL

## 2024-11-29 ENCOUNTER — TELEPHONE (OUTPATIENT)
Dept: HEMATOLOGY ONCOLOGY | Facility: CLINIC | Age: 59
End: 2024-11-29

## 2024-12-11 ENCOUNTER — OFFICE VISIT (OUTPATIENT)
Dept: HEMATOLOGY ONCOLOGY | Facility: CLINIC | Age: 59
End: 2024-12-11
Payer: COMMERCIAL

## 2024-12-11 VITALS
OXYGEN SATURATION: 100 % | BODY MASS INDEX: 26.9 KG/M2 | SYSTOLIC BLOOD PRESSURE: 112 MMHG | HEART RATE: 88 BPM | DIASTOLIC BLOOD PRESSURE: 74 MMHG | RESPIRATION RATE: 18 BRPM | WEIGHT: 137 LBS | HEIGHT: 60 IN | TEMPERATURE: 97.2 F

## 2024-12-11 DIAGNOSIS — I82.409 RECURRENT DEEP VEIN THROMBOSIS (DVT) (HCC): Primary | ICD-10-CM

## 2024-12-11 PROCEDURE — 99211 OFF/OP EST MAY X REQ PHY/QHP: CPT | Performed by: INTERNAL MEDICINE

## 2024-12-11 NOTE — PROGRESS NOTES
Lazara Ivy  1965  [facility] HEMATOLOGY ONCOLOGY SPECIALISTS [place]  [address] [facility] [place] [address]    Chief Complaint   Patient presents with    Follow-up   Came for F/U of hypercoagulable work up , no major complain       Oncology History    No history exists.       History of Present Illness:  -No ref. provider found:    59 year old female with history of DVT who is seen in follow up for recurrent DVT/PE.  Her first DVT occurred in . Per pt, this was provoked following surgery. She had recurrent DVT/multiple PE in R lung recently in 2024. At this time she was also found to have large L pneumothorax. Patient reports URI and recent hormone use (progesterone pill, estrogen patch, vaginal estrogen cream) prior to event.  Has been on Eliquis 5 mg p.o. twice daily since, tolerating well.        Review of Systems    Patient Active Problem List   Diagnosis    Pneumothorax, left    SIRS (systemic inflammatory response syndrome) (HCC)    Pulmonary embolism (HCC)    DVT (deep vein thrombosis) in pregnancy     Past Medical History:   Diagnosis Date    Pneumothorax 2011     Past Surgical History:   Procedure Laterality Date    BREAST IMPLANT      BREAST IMPLANT       SECTION      KNEE SURGERY      TONSILLECTOMY       Family History   Problem Relation Age of Onset    Colon cancer Mother     Cancer Mother         Colon cancer stage 4 - liver mets  2011     Social History     Socioeconomic History    Marital status: Single     Spouse name: Not on file    Number of children: Not on file    Years of education: Not on file    Highest education level: Not on file   Occupational History    Not on file   Tobacco Use    Smoking status: Never    Smokeless tobacco: Never   Vaping Use    Vaping status: Never Used   Substance and Sexual Activity    Alcohol use: Not Currently     Alcohol/week: 3.0 standard drinks of alcohol     Types: 3 Glasses of wine per week     Drug use: No    Sexual activity: Yes     Partners: Male   Other Topics Concern    Not on file   Social History Narrative    Not on file     Social Drivers of Health     Financial Resource Strain: Not on file   Food Insecurity: No Food Insecurity (3/14/2024)    Nursing - Inadequate Food Risk Classification     Worried About Running Out of Food in the Last Year: Never true     Ran Out of Food in the Last Year: Never true     Ran Out of Food in the Last Year: Not on file   Transportation Needs: No Transportation Needs (3/14/2024)    PRAPARE - Transportation     Lack of Transportation (Medical): No     Lack of Transportation (Non-Medical): No   Physical Activity: Not on file   Stress: Not on file   Social Connections: Not on file   Intimate Partner Violence: Not on file   Housing Stability: Low Risk  (3/14/2024)    Housing Stability Vital Sign     Unable to Pay for Housing in the Last Year: No     Number of Times Moved in the Last Year: 1     Homeless in the Last Year: No       Current Outpatient Medications:     apixaban (Eliquis) 5 mg, Take 1 tablet (5 mg total) by mouth 2 (two) times a day, Disp: 180 tablet, Rfl: 1    benzonatate (TESSALON PERLES) 100 mg capsule, Take 1 capsule (100 mg total) by mouth 3 (three) times a day as needed for cough, Disp: 30 capsule, Rfl: 0    clonazePAM (KlonoPIN) 0.5 mg tablet, Take 0.5 mg by mouth 2 (two) times a day, Disp: , Rfl:     fluticasone (FLONASE) 50 mcg/act nasal spray, into each nostril, Disp: , Rfl:     glucosamine-chondroitin 500-400 MG tablet, Take 1 tablet by mouth 3 (three) times a day, Disp: , Rfl:     Nurtec 75 MG TBDP, , Disp: , Rfl:     phentermine 15 MG capsule, Take 15 mg by mouth every morning, Disp: , Rfl:     promethazine-dextromethorphan (PHENERGAN-DM) 6.25-15 mg/5 mL oral syrup, Take 2.5 mL by mouth 2 (two) times a day as needed for cough, Disp: 240 mL, Rfl: 0    topiramate (TOPAMAX) 50 MG tablet, Take 50 mg by mouth every 12 (twelve) hours, Disp: , Rfl:      "apixaban (Eliquis) 5 mg, Take 2 tablets (10 mg total) by mouth 2 (two) times a day for 7 days, THEN 1 tablet (5 mg total) 2 (two) times a day for 23 days. (Patient not taking: Reported on 10/4/2024), Disp: 74 tablet, Rfl: 0    lidocaine (LIDODERM) 5 %, Apply 1 patch topically over 12 hours daily Remove & Discard patch within 12 hours or as directed by MD (Patient not taking: Reported on 7/5/2024), Disp: 30 patch, Rfl: 0    NON FORMULARY, BOTOX injections every 3-4 months for migraines., Disp: , Rfl:   Allergies   Allergen Reactions    Other Sneezing     \"seasonal allergies\" - congestion     Vitals:    12/11/24 1448   BP: 112/74   Pulse: 88   Resp: 18   Temp: (!) 97.2 °F (36.2 °C)   SpO2: 100%         /74 (BP Location: Left arm, Patient Position: Sitting, Cuff Size: Adult)   Pulse 88   Temp (!) 97.2 °F (36.2 °C) (Temporal)   Resp 18   Ht 5' (1.524 m)   Wt 62.1 kg (137 lb)   LMP 07/26/2017   SpO2 100%   BMI 26.76 kg/m²     Physical examination :     General Appearance:    Alert, cooperative, no distress, appears stated age   Head:    Normocephalic, without obvious abnormality, atraumatic   Eyes:    PERRL, conjunctiva/corneas clear, EOM's intact, fundi     benign, both eyes        Ears:    Normal TM's and external ear canals, both ears   Nose:   Nares normal, septum midline, mucosa normal, no drainage    or sinus tenderness   Throat:   Lips, mucosa, and tongue normal; teeth and gums normal   Neck:   Supple, symmetrical, trachea midline, no adenopathy;        thyroid:  No enlargement/tenderness/nodules; no carotid    bruit or JVD   Back:     Symmetric, no curvature, ROM normal, no CVA tenderness   Lungs:     Clear to auscultation bilaterally, respirations unlabored   Chest wall:    No tenderness or deformity   Heart:    Regular rate and rhythm, S1 and S2 normal, no murmur, rub    or gallop   Abdomen:     Soft, non-tender, bowel sounds active all four quadrants,     no masses, no organomegaly         "   Extremities:   Extremities normal, atraumatic, no cyanosis or edema   Pulses:   2+ and symmetric all extremities   Skin:   Skin color, texture, turgor normal, no rashes or lesions   Lymph nodes:   Cervical, supraclavicular, and axillary nodes normal   Neurologic:   CNII-XII intact. Normal strength, sensation and reflexes       throughout        Labs:  Hypercoagulable work up is negative   Factor V laden mutation and prothrombin gene mutation is negative     Discussion/Summary:    1) DVT / PE :     I had a lengthy discussion with the patient regarding the result of hypercoagulable work up which revealed no evidence of any thrombophilic condition   I explained to her that it is true that she had a recurrent PE/DVT , but both of them are provacative either after surgery or after use of hormonal treatment   There are two approaches  in these situation , either lifelong anticoagulation or 6 month of anticoagulation after the last PE on March     I think it is a reasonable approach to consider both thromboembolic event provocative and D/C anticoagulation after 6 months   The patient is waned against use of any hormonal treatment and in case of any surgery she may need to be anticoagulated in the post operative  period     F/Y TORITO Peña MD